# Patient Record
Sex: FEMALE | Race: WHITE | Employment: OTHER | URBAN - METROPOLITAN AREA
[De-identification: names, ages, dates, MRNs, and addresses within clinical notes are randomized per-mention and may not be internally consistent; named-entity substitution may affect disease eponyms.]

---

## 2021-05-26 ENCOUNTER — OFFICE VISIT (OUTPATIENT)
Dept: ORTHOPEDIC SURGERY | Age: 66
End: 2021-05-26
Payer: MEDICARE

## 2021-05-26 VITALS
WEIGHT: 179 LBS | OXYGEN SATURATION: 99 % | HEIGHT: 63 IN | TEMPERATURE: 97.7 F | BODY MASS INDEX: 31.71 KG/M2 | HEART RATE: 85 BPM

## 2021-05-26 DIAGNOSIS — M25.562 CHRONIC PAIN OF LEFT KNEE: Primary | ICD-10-CM

## 2021-05-26 DIAGNOSIS — M17.12 ARTHRITIS OF LEFT KNEE: ICD-10-CM

## 2021-05-26 DIAGNOSIS — G89.29 CHRONIC PAIN OF LEFT KNEE: Primary | ICD-10-CM

## 2021-05-26 PROCEDURE — G8536 NO DOC ELDER MAL SCRN: HCPCS | Performed by: PHYSICIAN ASSISTANT

## 2021-05-26 PROCEDURE — 1090F PRES/ABSN URINE INCON ASSESS: CPT | Performed by: PHYSICIAN ASSISTANT

## 2021-05-26 PROCEDURE — 99204 OFFICE O/P NEW MOD 45 MIN: CPT | Performed by: PHYSICIAN ASSISTANT

## 2021-05-26 PROCEDURE — 1101F PT FALLS ASSESS-DOCD LE1/YR: CPT | Performed by: PHYSICIAN ASSISTANT

## 2021-05-26 PROCEDURE — 73564 X-RAY EXAM KNEE 4 OR MORE: CPT | Performed by: PHYSICIAN ASSISTANT

## 2021-05-26 PROCEDURE — G9899 SCRN MAM PERF RSLTS DOC: HCPCS | Performed by: PHYSICIAN ASSISTANT

## 2021-05-26 PROCEDURE — G8432 DEP SCR NOT DOC, RNG: HCPCS | Performed by: PHYSICIAN ASSISTANT

## 2021-05-26 PROCEDURE — 3017F COLORECTAL CA SCREEN DOC REV: CPT | Performed by: PHYSICIAN ASSISTANT

## 2021-05-26 PROCEDURE — G8399 PT W/DXA RESULTS DOCUMENT: HCPCS | Performed by: PHYSICIAN ASSISTANT

## 2021-05-26 PROCEDURE — G8427 DOCREV CUR MEDS BY ELIG CLIN: HCPCS | Performed by: PHYSICIAN ASSISTANT

## 2021-05-26 PROCEDURE — G8417 CALC BMI ABV UP PARAM F/U: HCPCS | Performed by: PHYSICIAN ASSISTANT

## 2021-05-26 RX ORDER — INSULIN ASPART 100 [IU]/ML
INJECTION, SUSPENSION SUBCUTANEOUS 3 TIMES DAILY
COMMUNITY

## 2021-05-26 RX ORDER — AMITRIPTYLINE HYDROCHLORIDE 50 MG/1
TABLET, FILM COATED ORAL
COMMUNITY
End: 2022-04-06

## 2021-05-26 RX ORDER — GUAIFENESIN 100 MG/5ML
81 LIQUID (ML) ORAL DAILY
COMMUNITY

## 2021-05-26 RX ORDER — ATORVASTATIN CALCIUM 20 MG/1
20 TABLET, FILM COATED ORAL DAILY
COMMUNITY

## 2021-05-26 RX ORDER — LISINOPRIL 20 MG/1
20 TABLET ORAL DAILY
COMMUNITY

## 2021-05-26 NOTE — PROGRESS NOTES
[address] [place] [address]  [phone]           Patient: Carito Bernal                MRN: [id number]       SSN: [id number]  YOB: 1955        AGE: 72 y.o. SEX: female  Body mass index is 31.71 kg/m². PCP: Apryl Jackson MD  05/26/21      This office note has been dictated. REVIEW OF SYSTEMS:  Constitutional: Negative for fever, chills, weight loss and malaise/fatigue. HENT: Negative. Eyes: Negative. Respiratory: Negative. Cardiovascular: Negative. Gastrointestinal: No bowel incontinence or constipation. Genitourinary: No bladder incontinence or saddle anesthesia. Skin: Negative. Neurological: Negative. Endo/Heme/Allergies: Negative. Psychiatric/Behavioral: Negative. Musculoskeletal: As per HPI above. Past Medical History:   Diagnosis Date    Arthritis     Asthma     [facility] ([facility]     Epilepsy ([facility]     Epilepsy ([facility]     Hypertension     Seizures ([facility]          Current Outpatient Medications:     lisinopriL (PRINIVIL, ZESTRIL) 20 mg tablet, Take 20 mg by mouth daily. , Disp: , Rfl:     aspirin 81 mg chewable tablet, Take 81 mg by mouth daily. , Disp: , Rfl:     cholecalciferol, vitamin D3, (VITAMIN D3 PO), Take 50,000 Units by mouth every seven (7) days. , Disp: , Rfl:     amitriptyline (ELAVIL) 50 mg tablet, Take  by mouth nightly., Disp: , Rfl:     atorvastatin (LIPITOR) 20 mg tablet, Take 20 mg by mouth daily. , Disp: , Rfl:     SITagliptin (Januvia) 100 mg tablet, Take 100 mg by mouth daily. , Disp: , Rfl:     insulin glargine,hum.rec.anlog (LANTUS SC), 60 Units by SubCUTAneous route nightly., Disp: , Rfl:     insulin aspart protamine/insulin aspart (NovoLOG Mix 70-30FlexPen U-100) 100 unit/mL (70-30) inpn, by SubCUTAneous route three (3) times daily. , Disp: , Rfl:     Not on File    Social History     Socioeconomic History    Marital status:      Spouse name: Not on file    Number of children: Not on file    Years of education: Not on file    Highest education level: Not on file   Occupational History    Not on file   Tobacco Use    Smoking status: Former Smoker    Smokeless tobacco: Never Used   Substance and Sexual Activity    Alcohol use: Never    Drug use: Never    Sexual activity: Not on file   Other Topics Concern    Not on file   Social History Narrative    Not on file     Social Determinants of Health     Financial Resource Strain:     Difficulty of Paying Living Expenses:    Food Insecurity:     Worried About [address] in the Last Year:     [address] in the Last Year:    Transportation Needs:     Lack of Transportation (Medical):  Lack of Transportation (Non-Medical):    Physical Activity:     Days of Exercise per Week:     Minutes of Exercise per Session:    Stress:     Feeling of Stress :    Social Connections:     Frequency of Communication with Friends and Family:     Frequency of Social Gatherings with Friends and Family:     Attends Jain Services:     Active Member of Clubs or Organizations:     Attends Club or Organization Meetings:     Marital Status:    Intimate Partner Violence:     Fear of Current or Ex-Partner:     Emotionally Abused:     Physically Abused:     Sexually Abused:        Past Surgical History:   Procedure Laterality Date    HAND/FINGER SURGERY UNLISTED Right 1996    right thumb    HX BACK SURGERY  1998    2 rods 6 screws    HX KNEE ARTHROSCOPY Right 2002             Patient seen and evaluated today for opinion and advice regarding left knee pain. She does have discomfort in each of her knees however the left is the worst.  Is been ongoing for a number of months. She has had injection in the past which gave her no relief. She has decreased walking tolerance. She has trouble with stairs. She has trouble out of a chair. She has pain at night.     Patient denies recent fevers, chills, chest pain, SOB, or injuries. No recent systemic changes noted. A 12-point review of systems is performed today. Pertinent positives are noted. All other systems reviewed and otherwise are negative. Physical exam: General: Alert and oriented x3, nad.  well-developed, well nourished. normal affect, AF. NC/AT, EOMI, neck supple, trachea midline, no JVD present. Breathing is non-labored. Examination of the lower extremities reveals pain-free range of motion the hips. There is no pain to palpation the trochanteric bursa. Negative straight leg raise. Negative calf tenderness. Negative Homans. No signs of DVT present. Each of the knees reveal skin intact. There is no erythema, ecchymosis, warmth. There are no signs of infection or cellulitis present. She does have pain to palpation to the medial joint line of each of the knees worse on the left side as well as patellofemoral grind. Radiographs obtained the office today 5/26/2021 at the [place] location include AP, tunnel, lateral, skyline of each of the knees confirms end-stage arthritis with bone-on-bone eburnation to the medial compartment. No acute abnormalities noted. Assessment: Bilateral knee end-stage arthritis, left greater than right    Plan: At this point, we discussed treatment options. Cortisone is not effective. I do not think that she will get much relief from viscosupplementation. Surgical intervention was discussed and she like to move forward with a total knee replacement. The alternatives, risks, complications, expected outcome were discussed. The patient understands and wished to proceed. She was seen and evaluated today by Dr. Thomas Menendez as well.           JR Welch MPAS, PA-C, ATC

## 2022-01-27 ENCOUNTER — OFFICE VISIT (OUTPATIENT)
Dept: ORTHOPEDIC SURGERY | Age: 67
End: 2022-01-27
Payer: MEDICARE

## 2022-01-27 VITALS
OXYGEN SATURATION: 100 % | HEART RATE: 85 BPM | WEIGHT: 188.2 LBS | HEIGHT: 62 IN | BODY MASS INDEX: 34.63 KG/M2 | TEMPERATURE: 97.5 F

## 2022-01-27 DIAGNOSIS — M17.0 BILATERAL PRIMARY OSTEOARTHRITIS OF KNEE: ICD-10-CM

## 2022-01-27 DIAGNOSIS — M25.561 CHRONIC PAIN OF BOTH KNEES: ICD-10-CM

## 2022-01-27 DIAGNOSIS — G89.29 CHRONIC PAIN OF BOTH KNEES: ICD-10-CM

## 2022-01-27 DIAGNOSIS — E11.9 CONTROLLED TYPE 2 DIABETES MELLITUS WITHOUT COMPLICATION, UNSPECIFIED WHETHER LONG TERM INSULIN USE (HCC): ICD-10-CM

## 2022-01-27 DIAGNOSIS — M54.16 LUMBAR RADICULOPATHY: Primary | ICD-10-CM

## 2022-01-27 DIAGNOSIS — M70.62 GREATER TROCHANTERIC BURSITIS OF LEFT HIP: ICD-10-CM

## 2022-01-27 DIAGNOSIS — R20.2 NUMBNESS AND TINGLING OF LEFT LEG: ICD-10-CM

## 2022-01-27 DIAGNOSIS — M25.562 CHRONIC PAIN OF BOTH KNEES: ICD-10-CM

## 2022-01-27 DIAGNOSIS — R20.0 NUMBNESS AND TINGLING OF LEFT LEG: ICD-10-CM

## 2022-01-27 PROCEDURE — G8427 DOCREV CUR MEDS BY ELIG CLIN: HCPCS | Performed by: ORTHOPAEDIC SURGERY

## 2022-01-27 PROCEDURE — 3017F COLORECTAL CA SCREEN DOC REV: CPT | Performed by: ORTHOPAEDIC SURGERY

## 2022-01-27 PROCEDURE — G9899 SCRN MAM PERF RSLTS DOC: HCPCS | Performed by: ORTHOPAEDIC SURGERY

## 2022-01-27 PROCEDURE — G8399 PT W/DXA RESULTS DOCUMENT: HCPCS | Performed by: ORTHOPAEDIC SURGERY

## 2022-01-27 PROCEDURE — 1101F PT FALLS ASSESS-DOCD LE1/YR: CPT | Performed by: ORTHOPAEDIC SURGERY

## 2022-01-27 PROCEDURE — G8536 NO DOC ELDER MAL SCRN: HCPCS | Performed by: ORTHOPAEDIC SURGERY

## 2022-01-27 PROCEDURE — 2022F DILAT RTA XM EVC RTNOPTHY: CPT | Performed by: ORTHOPAEDIC SURGERY

## 2022-01-27 PROCEDURE — 1090F PRES/ABSN URINE INCON ASSESS: CPT | Performed by: ORTHOPAEDIC SURGERY

## 2022-01-27 PROCEDURE — 3046F HEMOGLOBIN A1C LEVEL >9.0%: CPT | Performed by: ORTHOPAEDIC SURGERY

## 2022-01-27 PROCEDURE — 99214 OFFICE O/P EST MOD 30 MIN: CPT | Performed by: ORTHOPAEDIC SURGERY

## 2022-01-27 PROCEDURE — G8417 CALC BMI ABV UP PARAM F/U: HCPCS | Performed by: ORTHOPAEDIC SURGERY

## 2022-01-27 PROCEDURE — G8510 SCR DEP NEG, NO PLAN REQD: HCPCS | Performed by: ORTHOPAEDIC SURGERY

## 2022-01-27 RX ORDER — GABAPENTIN 300 MG/1
CAPSULE ORAL
COMMUNITY
Start: 2021-12-26 | End: 2022-03-03

## 2022-01-27 RX ORDER — DICLOFENAC SODIUM 10 MG/G
GEL TOPICAL 4 TIMES DAILY
Qty: 100 G | Refills: 4 | Status: SHIPPED | OUTPATIENT
Start: 2022-01-27

## 2022-01-27 RX ORDER — METFORMIN HYDROCHLORIDE 1000 MG/1
1000 TABLET ORAL 2 TIMES DAILY
COMMUNITY

## 2022-01-27 RX ORDER — ERGOCALCIFEROL 1.25 MG/1
50000 CAPSULE ORAL
COMMUNITY

## 2022-01-27 RX ORDER — INSULIN LISPRO 100 [IU]/ML
12 INJECTION, SOLUTION INTRAVENOUS; SUBCUTANEOUS
COMMUNITY

## 2022-01-27 NOTE — PROGRESS NOTES
Patient: Rober Cota                MRN: [id number]       SSN: [id number]  YOB: 1955        AGE: 77 y.o. SEX: female  Body mass index is 34.42 kg/m². PCP: Wilfrid Griffith MD  01/27/22    Nirav Melgoza presents today for reevaluation of knee pain as well as low back pain with radiculopathy down the left leg going going all the way down to the toes if she stands at the kitchen sink for any prolonged period of time she at bedtime to go sit down no groin pain hurts her to roll over on the left hip at night as well she was scheduled to have knee replacement surgery but she has been she was having some other issues and canceled it will eventually be doing knee replacement surgeries both of them will need doing in a staged fashion    The pain is radicular a mild to moderate aching she points to the low back and left lateral hip area no groin discomfort.     The examination today she has quite restricted walking both knees are bad in varus antalgic bilateral gait holds her back stiffly she has difficulty negotiating stairs or steps and was difficult for her to get up on the exam table the low back is somewhat tender and she rests comfortably both hips rotate adequately including internal rotation and flexion she is tender over the greater trochanter on the left side she has decreased sensation to L4-5 worse on the left than the right EHLs are 5- out of 5 to bands of 5 out of 5 and straight leg raise is borderline positive with some radiculopathy extending just past the level of the knee but just mildly so the knees themselves are similar exam is to previous in varus couple degree fixed flexion deformity bends to 105 degrees and Homans' sign is negative    I did review previous x-rays of the knees which confirm end-stage arthritis of both knees    MRI without contrast done in early fall of last year confirm that her fusion looks solid but she is having degenerative and stenotic changes above and below the fusion levels. There was a discussion regarding surgery surgery is not recommended for the hip but is recommended for the knees I would recommend also a we also decided that she should see spine again I have offered to inject the left hip she is declined she like to try some cream and some therapy think it be very reasonable for her and I think a little bit of up rehab therapy I think will be helpful for eventually before knee replacements as well    We will see her back in the not-too-distant future we could consider injection for the left hip will do an AP pelvis when she returns    REVIEW OF SYSTEMS:      CON: negative  EYE: negative   ENT: negative  RESP: negative  GI:    negative   :  negative  MSK: Positive  A twelve point review of systems was completed, positives noted and all other systems were reviewed and are negative          Past Medical History:   Diagnosis Date    Arthritis     Asthma     Diabetes ([facility]     Epilepsy ([facility]     Epilepsy ([facility]     Hypertension     [facility]        History reviewed. No pertinent family history. Current Outpatient Medications   Medication Sig Dispense Refill    ergocalciferol (ERGOCALCIFEROL) 1,250 mcg (50,000 unit) capsule Take 50,000 Units by mouth every seven (7) days.  gabapentin (NEURONTIN) 300 mg capsule TAKE 1 CAPSULE BY MOUTH THREE TIMES DAILY FOR 30 DAYS      insulin lispro (HUMALOG) 100 unit/mL kwikpen 12 Units by SubCUTAneous route.  liraglutide (VICTOZA) 0.6 mg/0.1 mL (18 mg/3 mL) pnij 1.2 mg by SubCUTAneous route.  metFORMIN (GLUCOPHAGE) 1,000 mg tablet Take 1,000 mg by mouth two (2) times a day.  lisinopriL (PRINIVIL, ZESTRIL) 20 mg tablet Take 20 mg by mouth daily.  aspirin 81 mg chewable tablet Take 81 mg by mouth daily.  amitriptyline (ELAVIL) 50 mg tablet Take  by mouth nightly.  atorvastatin (LIPITOR) 20 mg tablet Take 20 mg by mouth daily.       SITagliptin (Januvia) 100 mg tablet Take 100 mg by mouth daily.  insulin glargine,hum.rec.anlog (LANTUS SC) 60 Units by SubCUTAneous route nightly.  insulin aspart protamine/insulin aspart (NovoLOG Mix 70-30FlexPen U-100) 100 unit/mL (70-30) inpn by SubCUTAneous route three (3) times daily. Not on File    Past Surgical History:   Procedure Laterality Date    HAND/FINGER SURGERY UNLISTED Right 1996    right thumb    HX BACK SURGERY  1998    2 rods 6 screws    HX KNEE ARTHROSCOPY Right 2002       Social History     Socioeconomic History    Marital status:      Spouse name: Not on file    Number of children: Not on file    Years of education: Not on file    Highest education level: Not on file   Occupational History    Not on file   Tobacco Use    Smoking status: Former Smoker    Smokeless tobacco: Never Used   Vaping Use    Vaping Use: Never used   Substance and Sexual Activity    Alcohol use: Never    Drug use: Never    Sexual activity: Not on file   Other Topics Concern    Not on file   Social History Narrative    Not on file     Social Determinants of Health     Financial Resource Strain:     Difficulty of Paying Living Expenses: Not on file   Food Insecurity:     Worried About [address] in the Last Year: Not on file    [address] in the Last Year: Not on file   Transportation Needs:     Lack of Transportation (Medical): Not on file    Lack of Transportation (Non-Medical):  Not on file   Physical Activity:     Days of Exercise per Week: Not on file    Minutes of Exercise per Session: Not on file   Stress:     Feeling of Stress : Not on file   Social Connections:     Frequency of Communication with Friends and Family: Not on file    Frequency of Social Gatherings with Friends and Family: Not on file    Attends Adventism Services: Not on file    Active Member of Clubs or Organizations: Not on file    Attends Club or Organization Meetings: Not on file    Marital Status: Not on file Intimate Partner Violence:     Fear of Current or Ex-Partner: Not on file    Emotionally Abused: Not on file    Physically Abused: Not on file    Sexually Abused: Not on file   Housing Stability:     Unable to Pay for Housing in the Last Year: Not on file    Number of [place] in the Last Year: Not on file    Unstable Housing in the Last Year: Not on file       Visit Vitals  Pulse 85   Temp 97.5 °F (36.4 °C) (Temporal)   Ht 5' 2\" (1.575 m)   Wt 85.4 kg (188 lb 3.2 oz)   SpO2 100%   BMI 34.42 kg/m²         PHYSICAL EXAMINATION:  GENERAL: Alert and oriented x3, in no acute distress, well-developed, well-nourished, afebrile. HEART: No JVD. EYES: No scleral icterus   NECK: No significant lymphadenopathy   LUNGS: No respiratory compromise or indrawing  ABDOMEN: Soft, non-tender, non-distended. Note: This note was completed using voice recognition software. Any typographical/name errors or mistakes are unintentional.    Electronically signed by:  Albaro Lopez MD

## 2022-02-04 ENCOUNTER — TELEPHONE (OUTPATIENT)
Dept: ORTHOPEDIC SURGERY | Age: 67
End: 2022-02-04

## 2022-03-02 NOTE — PROGRESS NOTES
[facility]  [address], [address]  Phone: [phone]  Fax: [phone]        INITIAL CONSULTATION      HISTORY OF PRESENT ILLNESS:  Nhung Villaseñor is a 77 y.o. female whom is referred from Dr. Stanton Huston secondary to BLE pain from her thighs in a L4 distribution to her toes. She rates her pain 9/10. Her pain is not exacerbated positionally. Pt reports limitations with walking. Pt admits to chronic hx of BUE and BLE paresthesias. Pt reports she initially complained of LLE pain x 9/2021 without injury. RLE sxs started a week after her visit with Dr. Stanton Huston on 1/27/2022. Denies much in the way of back pain. Pt had MRI done 8/11/2021 prior to reported pain complaint duration, pt states she had been sent to the ER on 8/11/2021 by her PCP secondary to left hip pain. Pt admits she is taking Neurontin 300 mg TID without relief through Rhett Floyd MD x 1 month. Pt is tolerating the medication well, Pt admits she underwent previous spinal surgery on 1/8/1998 through Dr. Jessy Srinivasan MD with good relief. Pt states she had been pain free up until last year. Patient denies previous spinal injections, or physical therapy/chiropractic care. Pt denies change in bowel or bladder habits. Pt denies fever, weight loss, or skin changes. Pt denies h/o stomach ulcers or bleeding disorders. PmHx of epilepsy, HTN, DM, Asthma, spinal surgery (L4-L5-S1 posterior fusion). Pt admits her blood sugars occasionally run above 200. Denies being followed for her DM. Note from Dr. Stanton Huston dated 1/27/2022 indicating patient was seen with c/o knee and back pain with radicular pain into the LLE to the toes. Worse with standing. Improved with sitting. End stage osteoarthritsi of bilateral knees. [facility] endorsed left hip pain that he offered to injection, pt declined. Referred to hip PT. Referred to spine. Lumbar spine MRI dated 8/11/2021 films independently reviewed.  Per report, stenosis/impingement: Central stenosis:  None significant. Lateral recess stenosis:  Low-grade left L1-L2. Foraminal stenosis:  None significant. Structure: L4-L5-S1 posterior fusion and decompression with solid osseous union. Advanced L2-L3 degenerative facet arthropathy with low-grade L2 anterolisthesis. Moderate disc degeneration T12-L3. The patient is RHD.  reviewed. Body mass index is 34.39 kg/m². PCP: Xavi Hector MD    Past Medical History:   Diagnosis Date    Arthritis     Asthma     [facility] ([facility]     Epilepsy ([facility]     Epilepsy ([facility]     Hypertension     Seizures ([facility]           Past Surgical History:   Procedure Laterality Date    HAND/FINGER SURGERY UNLISTED Right 1996    right thumb    HX BACK SURGERY  1998    2 rods 6 screws    HX KNEE ARTHROSCOPY Right 2002         Social History     Tobacco Use    Smoking status: Former Smoker    Smokeless tobacco: Never Used   Substance Use Topics    Alcohol use: Never       Work status: N/A  Marital status: N/A      Current Outpatient Medications   Medication Sig Dispense Refill    traZODone (DESYREL) 50 mg tablet TAKE 1 TABLET BY MOUTH EVERY DAY AT BEDTIME      ergocalciferol (ERGOCALCIFEROL) 1,250 mcg (50,000 unit) capsule Take 50,000 Units by mouth every seven (7) days.  insulin lispro (HUMALOG) 100 unit/mL kwikpen 12 Units by SubCUTAneous route.  liraglutide (VICTOZA) 0.6 mg/0.1 mL (18 mg/3 mL) pnij 1.2 mg by SubCUTAneous route.  metFORMIN (GLUCOPHAGE) 1,000 mg tablet Take 1,000 mg by mouth two (2) times a day.  diclofenac (VOLTAREN) 1 % gel Apply  to affected area four (4) times daily. 100 g 4    lisinopriL (PRINIVIL, ZESTRIL) 20 mg tablet Take 20 mg by mouth daily.  aspirin 81 mg chewable tablet Take 81 mg by mouth daily.  atorvastatin (LIPITOR) 20 mg tablet Take 20 mg by mouth daily.  insulin glargine,hum.rec.anlog (LANTUS SC) 60 Units by SubCUTAneous route nightly.       insulin aspart protamine/insulin aspart (NovoLOG Mix 70-30FlexPen U-100) 100 unit/mL (70-30) inpn by SubCUTAneous route three (3) times daily.  amitriptyline (ELAVIL) 50 mg tablet Take  by mouth nightly. (Patient not taking: Reported on 3/3/2022)      SITagliptin (Januvia) 100 mg tablet Take 100 mg by mouth daily. (Patient not taking: Reported on 3/3/2022)         No Known Allergies       History reviewed. No pertinent family history. REVIEW OF SYSTEMS  Constitutional symptoms: Negative  Eyes: Negative  Ears, Nose, Throat, and Mouth: Negative  Cardiovascular: Negative  Respiratory: Negative  Genitourinary: Negative  Integumentary (Skin and/or breast): Negative  Musculoskeletal: Positive for BLE   Extremities: Negative for edema. Endocrine/Rheumatologic: Negative  Hematologic/Lymphatic: Negative  Allergic/Immunologic: Negative  Psychiatric: Negative       PHYSICAL EXAMINATION  Visit Vitals  Pulse 71   Temp 98.8 °F (37.1 °C) (Temporal)   Ht 5' 2\" (1.575 m)   Wt 188 lb (85.3 kg)   SpO2 100%   BMI 34.39 kg/m²       CONSTITUTIONAL: NAD, A&O x 3  HEART: Regular rate and rhythm  GASTROINTESTINAL: Positive bowel sounds, soft, nontender, and nondistended  LUNGS: Clear to auscultation bilaterally. SKIN: Negative for rash. Well healed midline incision  RANGE OF MOTION: The patient has full passive range of motion in all four extremities. SENSATION: sensation is intact to light touch throughout. MOTOR:   Straight Leg Raise: Negative, bilateral  Martínez: Negative, bilateral  Tandem Gait: Neg. Deep tendon reflexes are 1 at the biceps, 1 at the brachioradialis and 0 on the right and trace on the left at the triceps, bilaterally. Deep tendon reflexes are 0 at the knees and 0 at the ankles bilaterally.   Examined in a wheelchair    Pt reports RLE weakness     Shoulder AB/Flex Elbow Flex Wrist Ext Elbow Ext Wrist Flex Hand Intrin Tone   Right +4/5 +4/5 +4/5 +4/5 +4/5 +4/5 +4/5   Left +4/5 +4/5 +4/5 +4/5 +4/5 +4/5 +4/5 Hip Flex Knee Ext Knee Flex Ankle DF GTE Ankle PF Tone   Right +4/5 +4/5 +4/5 +4/5 +4/5 +4/5 +4/5   Left +4/5 +4/5 +4/5 +4/5 +4/5 +4/5 +4/5       ASSESSMENT   Diagnoses and all orders for this visit:    1. Facet arthropathy    2. Lumbar neuritis    3. Lumbar post-laminectomy syndrome        IMPRESSIONS/RECOMMENDATIONS:  Patient presents today with c/o BLE pain from her thighs in a L4 distribution to her toes. Multiple treatment options were discussed. Pt reports her blood sugars run above 200, I am unable explain her sxs based on her L spine MRI. I suspect her sxs may be related to neuropathy. I will order a BLE EMG for further evaluation of her sxs. I will increase her Neurontin 300 mg TID to 600 mg TID. Patient advised to call office if intolerant to higher dose. Patient is neurologically intact. I will see the patient back in 1 month's time or earlier if needed. Written by Emma Paredes, as dictated by Rossy Santos MD  I examined the patient, reviewed and agree with the note.

## 2022-03-03 ENCOUNTER — OFFICE VISIT (OUTPATIENT)
Dept: ORTHOPEDIC SURGERY | Age: 67
End: 2022-03-03
Payer: MEDICARE

## 2022-03-03 VITALS
HEIGHT: 62 IN | HEART RATE: 71 BPM | OXYGEN SATURATION: 100 % | BODY MASS INDEX: 34.6 KG/M2 | WEIGHT: 188 LBS | TEMPERATURE: 98.8 F

## 2022-03-03 DIAGNOSIS — M96.1 LUMBAR POST-LAMINECTOMY SYNDROME: ICD-10-CM

## 2022-03-03 DIAGNOSIS — M54.16 LUMBAR NEURITIS: ICD-10-CM

## 2022-03-03 DIAGNOSIS — M47.819 FACET ARTHROPATHY: Primary | ICD-10-CM

## 2022-03-03 PROCEDURE — 3017F COLORECTAL CA SCREEN DOC REV: CPT | Performed by: PHYSICAL MEDICINE & REHABILITATION

## 2022-03-03 PROCEDURE — 1090F PRES/ABSN URINE INCON ASSESS: CPT | Performed by: PHYSICAL MEDICINE & REHABILITATION

## 2022-03-03 PROCEDURE — G8399 PT W/DXA RESULTS DOCUMENT: HCPCS | Performed by: PHYSICAL MEDICINE & REHABILITATION

## 2022-03-03 PROCEDURE — G8417 CALC BMI ABV UP PARAM F/U: HCPCS | Performed by: PHYSICAL MEDICINE & REHABILITATION

## 2022-03-03 PROCEDURE — 99204 OFFICE O/P NEW MOD 45 MIN: CPT | Performed by: PHYSICAL MEDICINE & REHABILITATION

## 2022-03-03 PROCEDURE — G9899 SCRN MAM PERF RSLTS DOC: HCPCS | Performed by: PHYSICAL MEDICINE & REHABILITATION

## 2022-03-03 PROCEDURE — G8536 NO DOC ELDER MAL SCRN: HCPCS | Performed by: PHYSICAL MEDICINE & REHABILITATION

## 2022-03-03 PROCEDURE — G8432 DEP SCR NOT DOC, RNG: HCPCS | Performed by: PHYSICAL MEDICINE & REHABILITATION

## 2022-03-03 PROCEDURE — G8427 DOCREV CUR MEDS BY ELIG CLIN: HCPCS | Performed by: PHYSICAL MEDICINE & REHABILITATION

## 2022-03-03 PROCEDURE — 1101F PT FALLS ASSESS-DOCD LE1/YR: CPT | Performed by: PHYSICAL MEDICINE & REHABILITATION

## 2022-03-03 RX ORDER — TRAZODONE HYDROCHLORIDE 50 MG/1
TABLET ORAL
COMMUNITY
Start: 2022-02-07

## 2022-03-03 RX ORDER — GABAPENTIN 600 MG/1
600 TABLET ORAL 3 TIMES DAILY
Qty: 90 TABLET | Refills: 1 | Status: SHIPPED | OUTPATIENT
Start: 2022-03-03 | End: 2022-04-06

## 2022-03-03 NOTE — LETTER
3/3/2022    Patient: Rober Cota   YOB: 1955   Date of Visit: 3/3/2022     lAma Arias MD  [address] [address]  Via Fax: Samantha Nieves MD  [address] [id number]  [address] [facility] [address]    Dear Jacquelene Bang, MD Severa Pfeiffer, MD,      Thank you for referring Ms. Rober Cota to [facility] for evaluation. My notes for this consultation are attached. If you have questions, please do not hesitate to call me. I look forward to following your patient along with you.       Sincerely,    Sammi Campbell MD

## 2022-03-04 DIAGNOSIS — M96.1 LUMBAR POST-LAMINECTOMY SYNDROME: ICD-10-CM

## 2022-03-04 DIAGNOSIS — M54.16 LUMBAR NEURITIS: ICD-10-CM

## 2022-03-04 DIAGNOSIS — M47.819 FACET ARTHROPATHY: ICD-10-CM

## 2022-03-10 ENCOUNTER — OFFICE VISIT (OUTPATIENT)
Dept: ORTHOPEDIC SURGERY | Age: 67
End: 2022-03-10
Payer: MEDICARE

## 2022-03-10 VITALS
BODY MASS INDEX: 34.6 KG/M2 | HEIGHT: 62 IN | HEART RATE: 65 BPM | TEMPERATURE: 97.1 F | WEIGHT: 188 LBS | OXYGEN SATURATION: 100 %

## 2022-03-10 DIAGNOSIS — M54.50 LUMBAR PAIN: ICD-10-CM

## 2022-03-10 DIAGNOSIS — R26.2 DIFFICULTY WALKING: ICD-10-CM

## 2022-03-10 DIAGNOSIS — M17.0 BILATERAL PRIMARY OSTEOARTHRITIS OF KNEE: ICD-10-CM

## 2022-03-10 DIAGNOSIS — M70.62 GREATER TROCHANTERIC BURSITIS OF LEFT HIP: Primary | ICD-10-CM

## 2022-03-10 PROCEDURE — 1101F PT FALLS ASSESS-DOCD LE1/YR: CPT | Performed by: ORTHOPAEDIC SURGERY

## 2022-03-10 PROCEDURE — G8399 PT W/DXA RESULTS DOCUMENT: HCPCS | Performed by: ORTHOPAEDIC SURGERY

## 2022-03-10 PROCEDURE — G8427 DOCREV CUR MEDS BY ELIG CLIN: HCPCS | Performed by: ORTHOPAEDIC SURGERY

## 2022-03-10 PROCEDURE — G8417 CALC BMI ABV UP PARAM F/U: HCPCS | Performed by: ORTHOPAEDIC SURGERY

## 2022-03-10 PROCEDURE — G8536 NO DOC ELDER MAL SCRN: HCPCS | Performed by: ORTHOPAEDIC SURGERY

## 2022-03-10 PROCEDURE — 3017F COLORECTAL CA SCREEN DOC REV: CPT | Performed by: ORTHOPAEDIC SURGERY

## 2022-03-10 PROCEDURE — G8432 DEP SCR NOT DOC, RNG: HCPCS | Performed by: ORTHOPAEDIC SURGERY

## 2022-03-10 PROCEDURE — 72170 X-RAY EXAM OF PELVIS: CPT | Performed by: ORTHOPAEDIC SURGERY

## 2022-03-10 PROCEDURE — G9899 SCRN MAM PERF RSLTS DOC: HCPCS | Performed by: ORTHOPAEDIC SURGERY

## 2022-03-10 PROCEDURE — 1090F PRES/ABSN URINE INCON ASSESS: CPT | Performed by: ORTHOPAEDIC SURGERY

## 2022-03-10 PROCEDURE — 99214 OFFICE O/P EST MOD 30 MIN: CPT | Performed by: ORTHOPAEDIC SURGERY

## 2022-03-10 NOTE — PROGRESS NOTES
Patient: William Lundborg                MRN: [id number]       SSN: [id number]  YOB: 1955        AGE: 77 y.o. SEX: female  Body mass index is 34.39 kg/m². PCP: Keely Sy MD  03/10/22    Tho Mclaughlin presents today for reevaluation of low back pain with radiculopathy hip pain knee pain she has known end-stage arthritis of the knee is unrevealing and surgery she may require a back procedure she saw Dr. Jade Saenz she can be having an EMG nerve conduction study she has quite symptomatic stenosis and frequently has to lean forward she does better behind a shopping cart when she goes to the store. The examination today her hips rotate adequately pop positive straight leg raise bilaterally and Homans' sign is negative both feet warm and well-perfused. Calf nontender she does have some decrease sensation L4 and L5 although no foot drop. Low back's mildly tender    Reviewed the x-rays from today AP pelvis on 3/10/2022 confirms just mild arthritis of the hips I do not recommend hip replaced replacement surgery for we decided we discussed that decided together and I think she should hold off on the right surgery as well until the back is straight and around I am very pleased she is seeing Dr. Jade Saenz I can see her back in a few months time to to check on her progress a prescription for a wheeled walker with a seat was issued    REVIEW OF SYSTEMS:      CON: negative  EYE: negative   ENT: negative  RESP: negative  GI:    negative   :  negative  MSK: Positive  A twelve point review of systems was completed, positives noted and all other systems were reviewed and are negative          Past Medical History:   Diagnosis Date    Arthritis     Asthma     Diabetes ([facility]     Epilepsy ([facility]     Epilepsy ([facility]     Hypertension     [facility]        History reviewed. No pertinent family history.     Current Outpatient Medications   Medication Sig Dispense Refill    traZODone (DESYREL) 50 mg tablet TAKE 1 TABLET BY MOUTH EVERY DAY AT BEDTIME      liraglutide (VICTOZA) 0.6 mg/0.1 mL (18 mg/3 mL) pnij 1.2 mg by SubCUTAneous route.  metFORMIN (GLUCOPHAGE) 1,000 mg tablet Take 1,000 mg by mouth two (2) times a day.  diclofenac (VOLTAREN) 1 % gel Apply  to affected area four (4) times daily. 100 g 4    lisinopriL (PRINIVIL, ZESTRIL) 20 mg tablet Take 20 mg by mouth daily.  aspirin 81 mg chewable tablet Take 81 mg by mouth daily.  atorvastatin (LIPITOR) 20 mg tablet Take 20 mg by mouth daily.  insulin glargine,hum.rec.anlog (LANTUS SC) 60 Units by SubCUTAneous route nightly.  gabapentin (Neurontin) 600 mg tablet Take 1 Tablet by mouth three (3) times daily. Max Daily Amount: 1,800 mg. (Patient not taking: Reported on 3/10/2022) 90 Tablet 1    ergocalciferol (ERGOCALCIFEROL) 1,250 mcg (50,000 unit) capsule Take 50,000 Units by mouth every seven (7) days. (Patient not taking: Reported on 3/10/2022)      insulin lispro (HUMALOG) 100 unit/mL kwikpen 12 Units by SubCUTAneous route.  amitriptyline (ELAVIL) 50 mg tablet Take  by mouth nightly. (Patient not taking: Reported on 3/3/2022)      SITagliptin (Januvia) 100 mg tablet Take 100 mg by mouth daily. (Patient not taking: Reported on 3/3/2022)      insulin aspart protamine/insulin aspart (NovoLOG Mix 70-30FlexPen U-100) 100 unit/mL (70-30) inpn by SubCUTAneous route three (3) times daily.          No Known Allergies    Past Surgical History:   Procedure Laterality Date    HAND/FINGER SURGERY UNLISTED Right 1996    right thumb    HX BACK SURGERY  1998    2 rods 6 screws    HX KNEE ARTHROSCOPY Right 2002       Social History     Socioeconomic History    Marital status:      Spouse name: Not on file    Number of children: Not on file    Years of education: Not on file    Highest education level: Not on file   Occupational History    Not on file   Tobacco Use    Smoking status: Former Smoker    Smokeless tobacco: Never Used   Vaping Use    Vaping Use: Never used   Substance and Sexual Activity    Alcohol use: Never    Drug use: Never    Sexual activity: Not on file   Other Topics Concern    Not on file   Social History Narrative    Not on file     Social Determinants of Health     Financial Resource Strain:     Difficulty of Paying Living Expenses: Not on file   Food Insecurity:     Worried About Running Out of Food in the Last Year: Not on file    Stephanie of Food in the Last Year: Not on file   Transportation Needs:     Lack of Transportation (Medical): Not on file    Lack of Transportation (Non-Medical): Not on file   Physical Activity:     Days of Exercise per Week: Not on file    Minutes of Exercise per Session: Not on file   Stress:     Feeling of Stress : Not on file   Social Connections:     Frequency of Communication with Friends and Family: Not on file    Frequency of Social Gatherings with Friends and Family: Not on file    Attends Judaism Services: Not on file    Active Member of [address] [place] [address] or Organizations: Not on file    Attends Club or Organization Meetings: Not on file    Marital Status: Not on file   Intimate Partner Violence:     Fear of Current or Ex-Partner: Not on file    Emotionally Abused: Not on file    Physically Abused: Not on file    Sexually Abused: Not on file   Housing Stability:     Unable to Pay for Housing in the Last Year: Not on file    Number of [place] in the Last Year: Not on file    Unstable Housing in the Last Year: Not on file       Visit Vitals  Pulse 65   Temp 97.1 °F (36.2 °C) (Temporal)   Ht 5' 2\" (1.575 m)   Wt 188 lb (85.3 kg)   SpO2 100%   BMI 34.39 kg/m²         PHYSICAL EXAMINATION:  GENERAL: Alert and oriented x3, in no acute distress, well-developed, well-nourished, afebrile. HEART: No JVD.   EYES: No scleral icterus   NECK: No significant lymphadenopathy   LUNGS: No respiratory compromise or indrawing  ABDOMEN: Soft, non-tender, non-distended. Note: This note was completed using voice recognition software. Any typographical/name errors or mistakes are unintentional.    Electronically signed by:  Katy Godoy MD

## 2022-04-06 ENCOUNTER — OFFICE VISIT (OUTPATIENT)
Dept: ORTHOPEDIC SURGERY | Age: 67
End: 2022-04-06
Payer: MEDICARE

## 2022-04-06 ENCOUNTER — TELEPHONE (OUTPATIENT)
Dept: ORTHOPEDIC SURGERY | Age: 67
End: 2022-04-06

## 2022-04-06 VITALS
TEMPERATURE: 96.8 F | SYSTOLIC BLOOD PRESSURE: 170 MMHG | HEIGHT: 62 IN | WEIGHT: 186 LBS | HEART RATE: 69 BPM | BODY MASS INDEX: 34.23 KG/M2 | OXYGEN SATURATION: 100 % | DIASTOLIC BLOOD PRESSURE: 73 MMHG

## 2022-04-06 DIAGNOSIS — M96.1 LUMBAR POST-LAMINECTOMY SYNDROME: ICD-10-CM

## 2022-04-06 DIAGNOSIS — R20.0 NUMBNESS AND TINGLING OF BOTH LOWER EXTREMITIES: Primary | ICD-10-CM

## 2022-04-06 DIAGNOSIS — R20.2 NUMBNESS AND TINGLING OF BOTH LOWER EXTREMITIES: Primary | ICD-10-CM

## 2022-04-06 DIAGNOSIS — R94.131 ABNORMAL EMG: ICD-10-CM

## 2022-04-06 DIAGNOSIS — M79.604 PAIN IN BOTH LOWER EXTREMITIES: ICD-10-CM

## 2022-04-06 DIAGNOSIS — R20.2 NUMBNESS AND TINGLING OF BOTH LOWER EXTREMITIES: ICD-10-CM

## 2022-04-06 DIAGNOSIS — R20.0 NUMBNESS AND TINGLING OF BOTH LOWER EXTREMITIES: ICD-10-CM

## 2022-04-06 DIAGNOSIS — M79.605 PAIN IN BOTH LOWER EXTREMITIES: ICD-10-CM

## 2022-04-06 DIAGNOSIS — M54.16 LUMBAR NEURITIS: ICD-10-CM

## 2022-04-06 PROCEDURE — 95910 NRV CNDJ TEST 7-8 STUDIES: CPT | Performed by: PHYSICAL MEDICINE & REHABILITATION

## 2022-04-06 PROCEDURE — 95886 MUSC TEST DONE W/N TEST COMP: CPT | Performed by: PHYSICAL MEDICINE & REHABILITATION

## 2022-04-06 RX ORDER — GABAPENTIN 800 MG/1
800 TABLET ORAL 3 TIMES DAILY
Qty: 90 TABLET | Refills: 1 | Status: SHIPPED | OUTPATIENT
Start: 2022-04-06

## 2022-04-06 NOTE — PROGRESS NOTES
Beata Frye presents today for   Chief Complaint   Patient presents with    Procedure     EMG BLE       Is someone accompanying this pt? no    Is the patient using any DME equipment during OV? no    Depression Screening:  3 most recent PHQ Screens 1/27/2022   Little interest or pleasure in doing things Not at all   Feeling down, depressed, irritable, or hopeless Not at all   Total Score PHQ 2 0       Learning Assessment:  Learning Assessment 4/6/2022   PRIMARY LEARNER Patient   PRIMARY LANGUAGE ENGLISH   LEARNER PREFERENCE PRIMARY READING   ANSWERED BY patient   RELATIONSHIP SELF       Abuse Screening:  Abuse Screening Questionnaire 4/6/2022   Do you ever feel afraid of your partner? N   Are you in a relationship with someone who physically or mentally threatens you? N   Is it safe for you to go home? Y       Fall Risk  Fall Risk Assessment, last 12 mths 1/27/2022   Able to walk? Yes   Fall in past 12 months? 0   Do you feel unsteady? 1   Are you worried about falling 1   Is TUG test greater than 12 seconds? 1   Is the gait abnormal? 1   Number of falls in past 12 months 0   Fall with injury? -       Coordination of Care:  1. Have you been to the ER, urgent care clinic since your last visit? no  Hospitalized since your last visit? no    2. Have you seen or consulted any other health care providers outside of the [address] [place] [address] since your last visit? no Include any pap smears or colon screening.  no

## 2022-04-06 NOTE — PROGRESS NOTES
[address]  [address]  [address]  Phone: [phone]  Fax: [phone]        Charley aMrie  : 1955  PCP: Camila Christine MD  2022    ELECTROMYOGRAPHY AND NERVE CONDUCTION STUDIES    Kojo Boyd was referred by Dr. Pepper Price for electrodiagnostic evaluation of BLE paraesthesia. NCV & EMG Findings:  Evaluation of the left tibial motor and the right tibial motor nerves showed reduced amplitude (L3.4, R3.5 mV) and decreased conduction velocity (Knee-Ankle, L34, R35 m/s). The left Sup Fibular sensory nerve showed no response (14 cm). The right Sup Fibular sensory nerve showed no response (14 cm) and no response (Site 2). The left sural sensory nerve showed no response (Calf). The right sural sensory nerve showed no response (Calf) and no response (Site 2). All remaining nerves (as indicated in the following tables) were within normal limits. Left vs. Right side comparison data for the Fibular motor nerve indicates abnormal L-R velocity difference (Poplt-B Fib, 26 m/s). All remaining left vs. right side differences were within normal limits. Needle evaluation of the right anterior tibialis muscle showed increased insertional activity, slightly increased spontaneous activity, and moderately increased polyphasic potentials. The right Fibularis Long, the left anterior tibialis, and the right abductor hallucis muscles showed moderately increased polyphasic potentials. The left Fibularis Long muscle showed slightly increased polyphasic potentials. The left posterior tibialis muscle showed increased insertional activity and slightly increased spontaneous activity. The left abductor hallucis muscle showed increased motor unit amplitude and moderately increased polyphasic potentials. All remaining muscles (as indicated in the following table) showed no evidence of electrical instability.       INTERPRETATION    This is an abnormal electrodiagnostic examination. These findings may be consistent with:   1. Severe chronic sensorimotor peripheral polyneuropathy - this is based on abnormalities throughout the NCS bilaterally. There are scattered signs of chronicity and mild muscle membrane instability. CLINICAL INTERPRETATION    Her electrodiagnostic findings may contribute to her pain symptoms, but are unlikely to fully explain the full extent of her presentation. HISTORY OF PRESENT ILLNESS  Senia Duncan is a 77 y.o. female. Pt presents today for BLE EMG evaluation of BLE pain, numbness, and tingling, R>L. Pt notes that she has severe pain in her right ankle that feels like someone is crushing it. She is s/p lumbar fusion L4-S1 (~1998) PmHx: DM    PAST MEDICAL HISTORY   Past Medical History:   Diagnosis Date    Arthritis     Asthma     [facility]     Epilepsy [facility]     Epile[facility]     Hypertension     Seizures ([facility]        Past Surgical History:   Procedure Laterality Date    HAND/FINGER SURGERY UNLISTED Right 1996    right thumb    HX BACK SURGERY  1998    2 rods 6 screws    HX KNEE ARTHROSCOPY Right 2002   . MEDICATIONS    Current Outpatient Medications   Medication Sig Dispense Refill    traZODone (DESYREL) 50 mg tablet TAKE 1 TABLET BY MOUTH EVERY DAY AT BEDTIME      gabapentin (Neurontin) 600 mg tablet Take 1 Tablet by mouth three (3) times daily. Max Daily Amount: 1,800 mg. (Patient not taking: Reported on 3/10/2022) 90 Tablet 1    ergocalciferol (ERGOCALCIFEROL) 1,250 mcg (50,000 unit) capsule Take 50,000 Units by mouth every seven (7) days. (Patient not taking: Reported on 3/10/2022)      insulin lispro (HUMALOG) 100 unit/mL kwikpen 12 Units by SubCUTAneous route.  liraglutide (VICTOZA) 0.6 mg/0.1 mL (18 mg/3 mL) pnij 1.2 mg by SubCUTAneous route.  metFORMIN (GLUCOPHAGE) 1,000 mg tablet Take 1,000 mg by mouth two (2) times a day.       diclofenac (VOLTAREN) 1 % gel Apply  to affected area four (4) times daily. 100 g 4    lisinopriL (PRINIVIL, ZESTRIL) 20 mg tablet Take 20 mg by mouth daily.  aspirin 81 mg chewable tablet Take 81 mg by mouth daily.  amitriptyline (ELAVIL) 50 mg tablet Take  by mouth nightly. (Patient not taking: Reported on 3/3/2022)      atorvastatin (LIPITOR) 20 mg tablet Take 20 mg by mouth daily.  SITagliptin (Januvia) 100 mg tablet Take 100 mg by mouth daily. (Patient not taking: Reported on 3/3/2022)      insulin glargine,hum.rec.anlog (LANTUS SC) 60 Units by SubCUTAneous route nightly.  insulin aspart protamine/insulin aspart (NovoLOG Mix 70-30FlexPen U-100) 100 unit/mL (70-30) inpn by SubCUTAneous route three (3) times daily. ALLERGIES  No Known Allergies       SOCIAL HISTORY    Social History     Socioeconomic History    Marital status:    Tobacco Use    Smoking status: Former Smoker    Smokeless tobacco: Never Used   Vaping Use    Vaping Use: Never used   Substance and Sexual Activity    Alcohol use: Never    Drug use: Never       FAMILY HISTORY  No family history on file. PHYSICAL EXAMINATION  Visit Vitals  BP (!) 152/73 (BP 1 Location: Left upper arm, BP Patient Position: Sitting, BP Cuff Size: Adult) Comment: pt asymptomatic, MD aware   Pulse 69   Temp 96.8 °F (36 °C) (Temporal)   Ht 5' 2\" (1.575 m)   Wt 186 lb (84.4 kg)   SpO2 100% Comment: RA   BMI 34.02 kg/m²       Pain Assessment  4/6/2022   Location of Pain Leg   Location Modifiers Left;Right   Severity of Pain 9   Quality of Pain Throbbing; Sharp;Dull;Aching;Burning; Other (Comment)   Quality of Pain Comment electric shock, pressure, n/t   Duration of Pain Persistent   Frequency of Pain Constant   Aggravating Factors Other (Comment)   Aggravating Factors Comment wearing anything on my legs   Limiting Behavior Yes   Relieving Factors Nothing   Result of Injury No           Constitutional:  Well developed, well nourished, in no acute distress. Psychiatric: Affect and mood are appropriate. Integumentary: No rashes or abrasions noted on exposed areas. SPINE/MUSCULOSKELETAL EXAM    On brief examination: Severe pain in BLE with all movements.       NCV & EMG Findings:  Nerve Conduction Studies  Anti Sensory Summary Table     Stim Site NR Peak (ms) Norm Peak (ms) O-P Amp (µV) Norm O-P Amp Site1 Site2 Delta-P (ms) Dist (cm) Jaylon (m/s) Norm Jaylon (m/s)   Left Sup Fibular Anti Sensory (Ant Lat Mall)   14 cm NR  <4.4  >5.0 14 cm Ant Lat Mall  14.0  >32   Right Sup Fibular Anti Sensory (Ant Lat Mall)   14 cm NR 0.7 <4.4 0.3 >5.0 14 cm Ant Lat Mall 0.7 14.0 200 >32   Site 2 NR 3.4  4.8          Left Sural Anti Sensory (Lat Mall)   Calf NR  <4.5  >4.0 Calf Lat Mall  14.0  >35   Right Sural Anti Sensory (Lat Mall)   Calf NR  <4.5  >4.0 Calf Lat Mall  14.0  >35   Site 2 NR               Motor Summary Table     Stim Site NR Onset (ms) Norm Onset (ms) O-P Amp (mV) Norm O-P Amp Site1 Site2 Delta-0 (ms) Dist (cm) Jaylon (m/s) Norm Jaylon (m/s)   Left Fibular Motor (Ext Dig Brev)   Ankle    4.7 <6.5 1.6 >1.3 B Fib Ankle 7.0 27.5 39 >38   B Fib    11.7  1.4  Poplt B Fib 1.0 7.0 70 >40   Poplt    12.7  1.3          Right Fibular Motor (Ext Dig Brev)   Ankle    4.1 <6.5 1.3 >1.3 B Fib Ankle 6.2 28.0 45 >38   B Fib    10.3  1.0  Poplt B Fib 1.6 7.0 44 >40   Poplt    11.9  1.1          Left Tibial Motor (Abd Godoy Brev)   Ankle    3.9 <6.1 3.4 >4.4 Knee Ankle 9.5 32.0 34 >39   Knee    13.4  1.8          Right Tibial Motor (Abd Godoy Brev)   Ankle    4.1 <6.1 3.5 >4.4 Knee Ankle 9.2 32.0 35 >39   Knee    13.3  1.8            EMG     Side Muscle Nerve Root Ins Act Fibs Psw Amp Dur Poly Recrt Int Hossein Hey Comment   Right VastusMed Femoral L2-4 Nml Nml Nml Nml Nml 0 Nml Nml    Right AntTibialis Dp Br Fibular L4-5 Incr 1+ 1+ Nml Nml 2+ Nml Nml CRD   Right Fibularis Long Sup Br Fibular L5-S1 Nml Nml Nml Nml Nml 2+ Nml Nml    Right Gastroc Tibial S1-2 Nml Nml Nml Nml Nml 0 Nml Nml    Left VastusMed Femoral L2-4 Nml Nml Nml Nml Nml 0 Nml Nml    Left AntTibialis Dp Br Fibular L4-5 Nml Nml Nml Nml Nml 2+ Nml Nml CRD   Left Fibularis Long Sup Br Fibular L5-S1 Nml Nml Nml Nml Nml 1+ Nml Nml    Left Gastroc Tibial S1-2 Nml Nml Nml Nml Nml 0 Nml Nml    Right PostTibialis Tibial L5, S1 Nml Nml Nml Nml Nml 0 Nml Nml    Right AdductorLong Obturator L2-4 Nml Nml Nml Nml Nml 0 Nml Nml    Right AbdHallucis MedPlantar S1-2 Nml Nml Nml Nml Nml 2+ Nml Nml    Left PostTibialis Tibial L5, S1 Incr 1+ 1+ Nml Nml 0 Nml Nml    Left AdductorLong Obturator L2-4 Nml Nml Nml Nml Nml 0 Nml Nml    Left AbdHallucis MedPlantar S1-2 Nml Nml Nml Incr Nml 2+ Nml Nml        Nerve Conduction Studies  Anti Sensory Left/Right Comparison     Stim Site L Lat (ms) R Lat (ms) L-R Lat (ms) L Amp (µV) R Amp (µV) L-R Amp (%) Site1 Site2 L Jaylon (m/s) R Jaylon (m/s) L-R Jaylon (m/s)   Sup Fibular Anti Sensory (Ant Lat Mall)   14 cm  0.7   0.3  14 cm Ant Lat Mall  200    Sural Anti Sensory (Lat Mall)   Calf       Calf Lat Mall        Motor Left/Right Comparison     Stim Site L Lat (ms) R Lat (ms) L-R Lat (ms) L Amp (mV) R Amp (mV) L-R Amp (%) Site1 Site2 L Jaylon (m/s) R Jaylon (m/s) L-R Jaylon (m/s)   Fibular Motor (Ext Dig Brev)   Ankle 4.7 4.1 0.6 1.6 1.3 18.8 B Fib Ankle 39 45 6   B Fib 11.7 10.3 1.4 1.4 1.0 28.6 Poplt B Fib 70 44 26   Poplt 12.7 11.9 0.8 1.3 1.1 15.4        Tibial Motor (Abd Godoy Brev)   Ankle 3.9 4.1 0.2 3.4 3.5 2.9 Knee Ankle 34 35 1   Knee 13.4 13.3 0.1 1.8 1.8 0.0              Waveforms:                            [facility]  OFFICE PROCEDURE PROGRESS NOTE        Chart reviewed for the following:   Germán WHITFIEDL, have reviewed the History, Physical and updated the Allergic reactions for Lorrie Edjossy     TIME OUT performed immediately prior to start of procedure:   Germán WHITFIELD, have performed the following reviews on Lorrie Doshi prior to the start of the procedure:            * Patient was identified by name and date of birth   * Agreement on procedure being performed was verified  * Risks and Benefits explained to the patient  * Procedure site verified and marked as necessary  * Patient was positioned for comfort  * Consent was signed and verified     Time: 12:08 PM    Date of procedure: 4/6/2022    Procedure performed by:  Ray Porras MD    Provider accompanied by: Derrell. Patient accompanied by: Self.     How tolerated by patient: tolerated the procedure well with no complications    Post Procedural Pain Scale: 2 - Hurts Little Bit    Comments: none    Written by Filomena Farley, [address] [place] [address] [address] as dictated by Donna Vasquez MD

## 2022-04-06 NOTE — TELEPHONE ENCOUNTER
Pt just had her emg and is in excruciating pain in her legs constant pain no relief is there anything else she can do please call pt to advise

## 2022-04-06 NOTE — TELEPHONE ENCOUNTER
Patient contacted and given instructions on how to ramp up the medication. She expressed her understanding.

## 2022-04-06 NOTE — LETTER
4/6/2022    Patient: Macario Villafuerte   YOB: 1955   Date of Visit: 4/6/2022     Kelli Gannon MD  [address] [place] [address] [address] [address]  Via Fax: [phone]    Dear Kelli Gannon MD,      Thank you for referring Ms. Macario Villafuerte to [facility] for evaluation. My notes for this consultation are attached. If you have questions, please do not hesitate to call me. I look forward to following your patient along with you.       Sincerely,    Felipe Zazueta MD

## 2022-04-13 NOTE — PROGRESS NOTES
[facility]  [address], [place]   Phone: [phone]  Fax: [phone]        PROGRESS NOTE      HISTORY OF PRESENT ILLNESS:  The patient is a 77 y.o. female and was seen today for follow up of BLE pain from her thighs in a L4 distribution to her toes. Her pain is not exacerbated positionally. Pt reports limitations with walking. Pt admits to chronic hx of BUE and BLE paresthesias. Pt reports she initially complained of LLE pain x 9/2021 without injury. RLE sxs started a week after her visit with Dr. Susi Sellers on 1/27/2022. Denies much in the way of back pain. Pt had MRI done 8/11/2021 prior to reported pain complaint duration, pt states she had been sent to the ER on 8/11/2021 by her PCP secondary to left hip pain. Pt admits she is taking Neurontin 300 mg TID without relief through Juan Darden MD x 1 month. Pt is tolerating the medication well, Pt admits she underwent previous spinal surgery on 1/8/1998 through Dr. Saida Priest MD with good relief. Pt states she had been pain free up until last year. Patient denies previous spinal injections, or physical therapy/chiropractic care. Pt denies change in bowel or bladder habits. Pt denies fever, weight loss, or skin changes. Pt denies h/o stomach ulcers or bleeding disorders. The patient is RHD. PmHx of epilepsy, HTN, DM, Asthma, spinal surgery (L4-L5-S1 posterior fusion). Pt admits her blood sugars occasionally run above 200. Denies being followed for her DM. Note from Dr. Susi Sellers dated 1/27/2022 indicating patient was seen with c/o knee and back pain with radicular pain into the LLE to the toes. Worse with standing. Improved with sitting. End stage osteoarthritsi of bilateral knees. Seh endorsed left hip pain that he offered to injection, pt declined. Referred to hip PT. Referred to spine. Lumbar spine MRI dated 8/11/2021 films independently reviewed.  Per report, stenosis/impingement: Central stenosis:  None significant. Lateral recess stenosis:  Low-grade left L1-L2. Foraminal stenosis:  None significant. Structure: L4-L5-S1 posterior fusion and decompression with solid osseous union. Advanced L2-L3 degenerative facet arthropathy with low-grade L2 anterolisthesis. Moderate disc degeneration T12-L3. At her last clinical appointment, pt reported her blood sugars run above 200, I was unable explain her sxs based on her L spine MRI. I suspected her sxs may be related to neuropathy. I ordered a BLE EMG for further evaluation of her sxs. I will increase her Neurontin 300 mg TID to 600 mg TID. Patient advised to call office if intolerant to higher dose.         The patient returns today with BLE pain from her mid thighs distally, pain is worst distally. She rates her pain 10/10, previously 9/10. She c/o severe right knee pain, follwowed by Dr. Sejal Flaherty. Pt was increased to Neurontin 800 mg TID on 4/6/2022. Pt is tolerating the Neurontin 800 mg TID without relief. She denies any low back pain. Pt denies change in bowel or bladder habits. Pt admits her blood sugars occasionally run around 200, dx with DM 20 years ago. A BLE EMG dated 4/6/2022 by Dr. Imelda Mills was suggestive of  1. Severe chronic sensorimotor peripheral polyneuropathy - this is based on abnormalities throughout the NCS bilaterally. There are scattered signs of chronicity and mild muscle membrane instability. Note from Dr. Sejal Flaherty dated 3/10/2022 indicating patient was seen for reevaluation of low back w/ adicular pain. Dx with endstage osteoarthritis of R knee.  reviewed. Body mass index is 34.39 kg/m².     PCP: Rubio Lua MD      Past Medical History:   Diagnosis Date    Arthritis     Asthma     Diabetes ([address]     Epilepsy ([address]     Epilepsy ([address]     Hypertension     Seizures ([address]         Social History     Socioeconomic History    Marital status:      Spouse name: Not on file    Number of children: Not on file    Years of education: Not on file    Highest education level: Not on file   Occupational History    Not on file   Tobacco Use    Smoking status: Former Smoker    Smokeless tobacco: Never Used   Vaping Use    Vaping Use: Never used   Substance and Sexual Activity    Alcohol use: Never    Drug use: Never    Sexual activity: Not on file   Other Topics Concern    Not on file   Social History Narrative    Not on file     Social Determinants of Health     Financial Resource Strain:     Difficulty of Paying Living Expenses: Not on file   Food Insecurity:     Worried About [address] in the Last Year: Not on file    Stephanie of Food in the Last Year: Not on file   Transportation Needs:     Lack of Transportation (Medical): Not on file    Lack of Transportation (Non-Medical): Not on file   Physical Activity:     Days of Exercise per Week: Not on file    Minutes of Exercise per Session: Not on file   Stress:     Feeling of Stress : Not on file   Social Connections:     Frequency of Communication with Friends and Family: Not on file    Frequency of Social Gatherings with Friends and Family: Not on file    Attends Sabianism Services: Not on file    Active Member of [address] [place] [address] or Organizations: Not on file    Attends Club or Organization Meetings: Not on file    Marital Status: Not on file   Intimate Partner Violence:     Fear of Current or Ex-Partner: Not on file    Emotionally Abused: Not on file    Physically Abused: Not on file    Sexually Abused: Not on file   Housing Stability:     Unable to Pay for Housing in the Last Year: Not on file    Number of [place] in the Last Year: Not on file    Unstable Housing in the Last Year: Not on file       Current Outpatient Medications   Medication Sig Dispense Refill    cholecalciferol (VITAMIN D3) (50,000 UNITS /1250 MCG) capsule TAKE 1 CAPSULE BY MOUTH ONCE A WEEK      gabapentin (Neurontin) 800 mg tablet Take 1 Tablet by mouth three (3) times daily.  Max Daily Amount: 2,400 mg. 90 Tablet 1    traZODone (DESYREL) 50 mg tablet TAKE 1 TABLET BY MOUTH EVERY DAY AT BEDTIME      ergocalciferol (ERGOCALCIFEROL) 1,250 mcg (50,000 unit) capsule Take 50,000 Units by mouth every seven (7) days.  insulin lispro (HUMALOG) 100 unit/mL kwikpen 12 Units by SubCUTAneous route.  liraglutide (VICTOZA) 0.6 mg/0.1 mL (18 mg/3 mL) pnij 1.2 mg by SubCUTAneous route.  metFORMIN (GLUCOPHAGE) 1,000 mg tablet Take 1,000 mg by mouth two (2) times a day.  diclofenac (VOLTAREN) 1 % gel Apply  to affected area four (4) times daily. 100 g 4    lisinopriL (PRINIVIL, ZESTRIL) 20 mg tablet Take 20 mg by mouth daily.  aspirin 81 mg chewable tablet Take 81 mg by mouth daily.  atorvastatin (LIPITOR) 20 mg tablet Take 20 mg by mouth daily.  insulin glargine,hum.rec.anlog (LANTUS SC) 60 Units by SubCUTAneous route nightly.  insulin aspart protamine/insulin aspart (NovoLOG Mix 70-30FlexPen U-100) 100 unit/mL (70-30) inpn by SubCUTAneous route three (3) times daily. No Known Allergies       PHYSICAL EXAMINATION    Visit Vitals  Pulse 67   Temp 97.8 °F (36.6 °C) (Temporal)   Ht 5' 2\" (1.575 m)   Wt 188 lb (85.3 kg)   SpO2 98%   BMI 34.39 kg/m²       CONSTITUTIONAL: NAD, A&O x 3  SENSATION: Decreased sensation to light touch on the RLE from the mid thigh distally and on the LLE from the area proximal to the knee distally. Otherwise, intact to light touch throughout  RANGE OF MOTION: The patient has full passive range of motion in all four extremities. MOTOR:  Straight Leg Raise: Negative, bilaterally     Examined in a wheelchair               Hip Flex Knee Ext Knee Flex Ankle DF GTE Ankle PF Tone   Right +4/5 +4/5 +4/5 +4/5 +4/5 +4/5 +4/5   Left +4/5 +4/5 +4/5 +4/5 +4/5 +4/5 +4/5       ASSESSMENT   Diagnoses and all orders for this visit:    1. Facet arthropathy    2. Lumbar radiculopathy    3. Lumbar pain    4. Lumbar neuritis    5.  Peripheral polyneuropathy      IMPRESSION AND PLAN:  Patient returns to the office today with c/o BLE pain from her mid thighs distally, pain is worst distally. Multiple treatment options were discussed. I do not appreciate significant spinal stenosis on L spine MRI. Sxs are consistent with neuropathy which was confirmed by EMG. She reports mininal low back pain complaints. I do not appreciate surgical pathology based on MRI and do not think blocks would be beneficial. I will wean her off the Neurontin 800 mg TID secondary to no relief. I will try her on Lyrica 75 mg BID. The risks, benefits, and potential side effects of this medication were discussed. Patient understands and wishes to proceed. Patient advised to call the office if intolerant to new medication. I recommended her to /u with her PCP secondary to her neuropathy. Patient is neurologically intact. I will see the patient back in 6 week's time or earlier if needed. Written by Goldie Chaves, as dictated by Burnadette Goodell, MD  I examined the patient, reviewed and agree with the note.

## 2022-04-14 ENCOUNTER — OFFICE VISIT (OUTPATIENT)
Dept: ORTHOPEDIC SURGERY | Age: 67
End: 2022-04-14
Payer: MEDICARE

## 2022-04-14 VITALS
HEIGHT: 62 IN | HEART RATE: 67 BPM | TEMPERATURE: 97.8 F | OXYGEN SATURATION: 98 % | BODY MASS INDEX: 34.6 KG/M2 | WEIGHT: 188 LBS

## 2022-04-14 DIAGNOSIS — M47.819 FACET ARTHROPATHY: Primary | ICD-10-CM

## 2022-04-14 DIAGNOSIS — M54.16 LUMBAR RADICULOPATHY: ICD-10-CM

## 2022-04-14 DIAGNOSIS — M54.16 LUMBAR NEURITIS: ICD-10-CM

## 2022-04-14 DIAGNOSIS — G62.9 PERIPHERAL POLYNEUROPATHY: ICD-10-CM

## 2022-04-14 DIAGNOSIS — M54.50 LUMBAR PAIN: ICD-10-CM

## 2022-04-14 PROCEDURE — 3017F COLORECTAL CA SCREEN DOC REV: CPT | Performed by: PHYSICAL MEDICINE & REHABILITATION

## 2022-04-14 PROCEDURE — G8432 DEP SCR NOT DOC, RNG: HCPCS | Performed by: PHYSICAL MEDICINE & REHABILITATION

## 2022-04-14 PROCEDURE — G8417 CALC BMI ABV UP PARAM F/U: HCPCS | Performed by: PHYSICAL MEDICINE & REHABILITATION

## 2022-04-14 PROCEDURE — 1101F PT FALLS ASSESS-DOCD LE1/YR: CPT | Performed by: PHYSICAL MEDICINE & REHABILITATION

## 2022-04-14 PROCEDURE — G8427 DOCREV CUR MEDS BY ELIG CLIN: HCPCS | Performed by: PHYSICAL MEDICINE & REHABILITATION

## 2022-04-14 PROCEDURE — G8536 NO DOC ELDER MAL SCRN: HCPCS | Performed by: PHYSICAL MEDICINE & REHABILITATION

## 2022-04-14 PROCEDURE — G8399 PT W/DXA RESULTS DOCUMENT: HCPCS | Performed by: PHYSICAL MEDICINE & REHABILITATION

## 2022-04-14 PROCEDURE — G9899 SCRN MAM PERF RSLTS DOC: HCPCS | Performed by: PHYSICAL MEDICINE & REHABILITATION

## 2022-04-14 PROCEDURE — 99214 OFFICE O/P EST MOD 30 MIN: CPT | Performed by: PHYSICAL MEDICINE & REHABILITATION

## 2022-04-14 PROCEDURE — 1090F PRES/ABSN URINE INCON ASSESS: CPT | Performed by: PHYSICAL MEDICINE & REHABILITATION

## 2022-04-14 RX ORDER — ASPIRIN 325 MG
TABLET, DELAYED RELEASE (ENTERIC COATED) ORAL
COMMUNITY
Start: 2022-04-06

## 2022-04-14 RX ORDER — PREGABALIN 75 MG/1
75 CAPSULE ORAL 2 TIMES DAILY
Qty: 60 CAPSULE | Refills: 1 | Status: SHIPPED | OUTPATIENT
Start: 2022-04-14 | End: 2022-05-11

## 2022-04-14 NOTE — LETTER
4/14/2022    Patient: Inocencia Bailey   YOB: 1955   Date of Visit: 4/14/2022     Alexandre Harris MD  [address] [address]  Via Fax: [phone]    Dear Alexandre Harris MD,      Thank you for referring Ms. Inocencia Bailey to [facility] for evaluation. My notes for this consultation are attached. If you have questions, please do not hesitate to call me. I look forward to following your patient along with you.       Sincerely,    Mahad Colorado MD

## 2022-04-14 NOTE — LETTER
4/14/2022 10:44 AM    Patient:  Cliff Mayen   YOB: 1955  Date of Visit: 4/14/2022      Dear Erica Min MD  [address] [place]: Thank you for referring Ms. Cliff Mayen to me for evaluation/treatment. Below are the relevant portions of my assessment and plan of care. Progress note dated 4/14/2022    HPI:    The patient returns today with BLE pain from her mid thighs distally, pain is worst distally. She rates her pain 10/10, previously 9/10. She c/o severe right knee pain, follwowed by Dr. Yoselin Hyman. Pt was increased to Neurontin 800 mg TID on 4/6/2022. Pt is tolerating the Neurontin 800 mg TID without relief. She denies any low back pain. Pt denies change in bowel or bladder habits. Pt admits her blood sugars occasionally run around 200, dx with DM 20 years ago. A BLE EMG dated 4/6/2022 by Dr. Davin Estrella was suggestive of  1. Severe chronic sensorimotor peripheral polyneuropathy - this is based on abnormalities throughout the NCS bilaterally. There are scattered signs of chronicity and mild muscle membrane instability. Note from Dr. Yoselin Hyman dated 3/10/2022 indicating patient was seen for reevaluation of low back w/ adicular pain. Dx with endstage osteoarthritis of R knee. IMPRESSION AND PLAN:  Patient returns to the office today with c/o BLE pain from her mid thighs distally, pain is worst distally. Multiple treatment options were discussed. I do not appreciate significant spinal stenosis on L spine MRI. Sxs are consistent with neuropathy which was confirmed by EMG. She reports mininal low back pain complaints. I do not appreciate surgical spinal pathology based on MRI and do not think blocks would be beneficial. I will wean her off the Neurontin 800 mg TID secondary to no relief. I will try her on Lyrica 75 mg BID. The risks, benefits, and potential side effects of this medication were discussed. Patient understands and wishes to proceed. Patient was advised to call the office if intolerant to new medication. I recommended her to /u with her PCP secondary to her neuropathy. Patient is neurologically intact. I will see the patient back in 6 week's time or earlier if needed. If you have questions, please do not hesitate to call me. I look forward to following Ms. Moises Millan along with you.         Sincerely,      Boni Smiley MD

## 2022-05-11 DIAGNOSIS — M54.16 LUMBAR RADICULOPATHY: ICD-10-CM

## 2022-05-11 DIAGNOSIS — M47.819 FACET ARTHROPATHY: Primary | ICD-10-CM

## 2022-05-11 DIAGNOSIS — M54.16 LUMBAR NEURITIS: ICD-10-CM

## 2022-05-11 RX ORDER — PREGABALIN 150 MG/1
150 CAPSULE ORAL 2 TIMES DAILY
Qty: 60 CAPSULE | Refills: 1 | Status: SHIPPED | OUTPATIENT
Start: 2022-05-11 | End: 2022-05-26 | Stop reason: SDUPTHER

## 2022-05-11 NOTE — TELEPHONE ENCOUNTER
Patient called stating that the pain pill isn't working & will like to up the dosage, patient stated that the doctor informed her if the pain medication isn't working that he will up the dosage.  Patient contact [phone]          pregabalin (Lyrica) 75 mg capsule

## 2022-05-12 ENCOUNTER — OFFICE VISIT (OUTPATIENT)
Dept: ORTHOPEDIC SURGERY | Age: 67
End: 2022-05-12
Payer: MEDICARE

## 2022-05-12 VITALS — OXYGEN SATURATION: 100 % | TEMPERATURE: 97.3 F | HEART RATE: 76 BPM

## 2022-05-12 DIAGNOSIS — M79.89 SWELLING OF RIGHT LOWER EXTREMITY: ICD-10-CM

## 2022-05-12 DIAGNOSIS — G89.29 BILATERAL CHRONIC KNEE PAIN: ICD-10-CM

## 2022-05-12 DIAGNOSIS — M25.562 BILATERAL CHRONIC KNEE PAIN: ICD-10-CM

## 2022-05-12 DIAGNOSIS — M17.11 PRIMARY OSTEOARTHRITIS OF RIGHT KNEE: Primary | ICD-10-CM

## 2022-05-12 DIAGNOSIS — M17.12 PRIMARY OSTEOARTHRITIS OF LEFT KNEE: ICD-10-CM

## 2022-05-12 DIAGNOSIS — M25.561 BILATERAL CHRONIC KNEE PAIN: ICD-10-CM

## 2022-05-12 DIAGNOSIS — I87.8 VENOUS STASIS: ICD-10-CM

## 2022-05-12 PROCEDURE — 73564 X-RAY EXAM KNEE 4 OR MORE: CPT | Performed by: ORTHOPAEDIC SURGERY

## 2022-05-12 PROCEDURE — 99214 OFFICE O/P EST MOD 30 MIN: CPT | Performed by: ORTHOPAEDIC SURGERY

## 2022-05-12 PROCEDURE — G8399 PT W/DXA RESULTS DOCUMENT: HCPCS | Performed by: ORTHOPAEDIC SURGERY

## 2022-05-12 PROCEDURE — G8432 DEP SCR NOT DOC, RNG: HCPCS | Performed by: ORTHOPAEDIC SURGERY

## 2022-05-12 PROCEDURE — 1090F PRES/ABSN URINE INCON ASSESS: CPT | Performed by: ORTHOPAEDIC SURGERY

## 2022-05-12 PROCEDURE — 1101F PT FALLS ASSESS-DOCD LE1/YR: CPT | Performed by: ORTHOPAEDIC SURGERY

## 2022-05-12 PROCEDURE — G8427 DOCREV CUR MEDS BY ELIG CLIN: HCPCS | Performed by: ORTHOPAEDIC SURGERY

## 2022-05-12 PROCEDURE — G8536 NO DOC ELDER MAL SCRN: HCPCS | Performed by: ORTHOPAEDIC SURGERY

## 2022-05-12 PROCEDURE — 3017F COLORECTAL CA SCREEN DOC REV: CPT | Performed by: ORTHOPAEDIC SURGERY

## 2022-05-12 PROCEDURE — G9899 SCRN MAM PERF RSLTS DOC: HCPCS | Performed by: ORTHOPAEDIC SURGERY

## 2022-05-12 PROCEDURE — G8417 CALC BMI ABV UP PARAM F/U: HCPCS | Performed by: ORTHOPAEDIC SURGERY

## 2022-05-12 NOTE — PROGRESS NOTES
Patient: Adrian Hurley                MRN: [id number]       SSN: [id number]  YOB: 1955        AGE: 77 y.o. SEX: female  There is no height or weight on file to calculate BMI. PCP: Bing Hernandez MD  05/12/22    Fabiola Confer returns reevaluation of hip pain knee pain her right knee is bothering her the most the the back is quiescent she gets some radiculopathy she also notes that occasionally the lower extremity can be slightly bluish and denies any shortness of breath chest pain or calf pain she also reports some posterior knee pain sometimes the pain can be radicular from her back but is really the right knee it is bothering her the most recently. The examination today's very nice lady she is using her wheeled walker with a seat and doing very well the right knee is in varus itself. Couple degree fixed flexion deformity bends well is about half plus pitting edema slight evidence of neuropathy with sharp testing and both feet are warm and well-perfused calf nontender Homans' sign is negative.     To slight effusion in the knee x-rays were done today for both knees AP tunnel lateral and skyline on 5/12/2022 confirming severe arthritis right knee    She is not keen on injections are really do not work well for her    There was a discussion regarding surgery and was decided that it is recommended I would recommend a right knee replacement I also recommend a vascular consultation ahead of time which will obtain    Risks and benefits described including but not limited to infection DVT pulmonary embolism anesthetic complications blood loss requiring transfusion chronic pain instability implant longevity arthrofibrosis and also the need for bending and straightening knee on an hourly basis to avoid complications    REVIEW OF SYSTEMS:      CON: negative  EYE: negative   ENT: negative  RESP: negative  GI:    negative   :  negative  MSK: Positive  A twelve point review of systems was completed, positives noted and all other systems were reviewed and are negative          Past Medical History:   Diagnosis Date    Arthritis     Asthma     Diabetes [facility]     Epilepsy ([facility]     Epilepsy ([facility]     Hypertension     Seizures ([facility]        History reviewed. No pertinent family history. Current Outpatient Medications   Medication Sig Dispense Refill    pregabalin (Lyrica) 150 mg capsule Take 1 Capsule by mouth two (2) times a day. Max Daily Amount: 300 mg. 60 Capsule 1    cholecalciferol (VITAMIN D3) (50,000 UNITS /1250 MCG) capsule TAKE 1 CAPSULE BY MOUTH ONCE A WEEK      gabapentin (Neurontin) 800 mg tablet Take 1 Tablet by mouth three (3) times daily. Max Daily Amount: 2,400 mg. 90 Tablet 1    traZODone (DESYREL) 50 mg tablet TAKE 1 TABLET BY MOUTH EVERY DAY AT BEDTIME      ergocalciferol (ERGOCALCIFEROL) 1,250 mcg (50,000 unit) capsule Take 50,000 Units by mouth every seven (7) days.  insulin lispro (HUMALOG) 100 unit/mL kwikpen 12 Units by SubCUTAneous route.  liraglutide (VICTOZA) 0.6 mg/0.1 mL (18 mg/3 mL) pnij 1.2 mg by SubCUTAneous route.  metFORMIN (GLUCOPHAGE) 1,000 mg tablet Take 1,000 mg by mouth two (2) times a day.  diclofenac (VOLTAREN) 1 % gel Apply  to affected area four (4) times daily. 100 g 4    lisinopriL (PRINIVIL, ZESTRIL) 20 mg tablet Take 20 mg by mouth daily.  aspirin 81 mg chewable tablet Take 81 mg by mouth daily.  atorvastatin (LIPITOR) 20 mg tablet Take 20 mg by mouth daily.  insulin glargine,hum.rec.anlog (LANTUS SC) 60 Units by SubCUTAneous route nightly.  insulin aspart protamine/insulin aspart (NovoLOG Mix 70-30FlexPen U-100) 100 unit/mL (70-30) inpn by SubCUTAneous route three (3) times daily.          No Known Allergies    Past Surgical History:   Procedure Laterality Date    HAND/FINGER SURGERY UNLISTED Right 1996    right thumb    HX BACK SURGERY  1998    2 rods 6 screws    HX KNEE ARTHROSCOPY Right 2002 Social History     Socioeconomic History    Marital status:      Spouse name: Not on file    Number of children: Not on file    Years of education: Not on file    Highest education level: Not on file   Occupational History    Not on file   Tobacco Use    Smoking status: Former Smoker    Smokeless tobacco: Never Used   Vaping Use    Vaping Use: Never used   Substance and Sexual Activity    Alcohol use: Never    Drug use: Never    Sexual activity: Not on file   Other Topics Concern    Not on file   Social History Narrative    Not on file     Social Determinants of Health     Financial Resource Strain:     Difficulty of Paying Living Expenses: Not on file   Food Insecurity:     Worried About [address] in the Last Year: Not on file    [address] in the Last Year: Not on file   Transportation Needs:     Lack of Transportation (Medical): Not on file    Lack of Transportation (Non-Medical):  Not on file   Physical Activity:     Days of Exercise per Week: Not on file    Minutes of Exercise per Session: Not on file   Stress:     Feeling of Stress : Not on file   Social Connections:     Frequency of Communication with Friends and Family: Not on file    Frequency of Social Gatherings with Friends and Family: Not on file    Attends Evangelical Services: Not on file    Active Member of [address] [place] [address] [employer] or Organizations: Not on file    Attends Club or Organization Meetings: Not on file    Marital Status: Not on file   Intimate Partner Violence:     Fear of Current or Ex-Partner: Not on file    Emotionally Abused: Not on file    Physically Abused: Not on file    Sexually Abused: Not on file   Housing Stability:     Unable to Pay for Housing in the Last Year: Not on file    Number of [place] in the Last Year: Not on file    Unstable Housing in the Last Year: Not on file       Visit Vitals  Pulse 76   Temp 97.3 °F (36.3 °C) (Temporal)   SpO2 100%         PHYSICAL EXAMINATION:  GENERAL: Alert and oriented x3, in no acute distress, well-developed, well-nourished, afebrile. HEART: No JVD. EYES: No scleral icterus   NECK: No significant lymphadenopathy   LUNGS: No respiratory compromise or indrawing  ABDOMEN: Soft, non-tender, non-distended. Note: This note was completed using voice recognition software. Any typographical/name errors or mistakes are unintentional.    Electronically signed by:  Félix Hill MD

## 2022-05-26 ENCOUNTER — OFFICE VISIT (OUTPATIENT)
Dept: ORTHOPEDIC SURGERY | Age: 67
End: 2022-05-26
Payer: MEDICARE

## 2022-05-26 VITALS
TEMPERATURE: 97.3 F | HEIGHT: 62 IN | OXYGEN SATURATION: 100 % | BODY MASS INDEX: 33.82 KG/M2 | HEART RATE: 71 BPM | WEIGHT: 183.8 LBS | RESPIRATION RATE: 20 BRPM

## 2022-05-26 DIAGNOSIS — M47.819 FACET ARTHROPATHY: ICD-10-CM

## 2022-05-26 DIAGNOSIS — G62.9 PERIPHERAL POLYNEUROPATHY: Primary | ICD-10-CM

## 2022-05-26 DIAGNOSIS — M54.16 LUMBAR RADICULOPATHY: ICD-10-CM

## 2022-05-26 DIAGNOSIS — M54.16 LUMBAR NEURITIS: ICD-10-CM

## 2022-05-26 PROCEDURE — 1090F PRES/ABSN URINE INCON ASSESS: CPT | Performed by: PHYSICAL MEDICINE & REHABILITATION

## 2022-05-26 PROCEDURE — 1123F ACP DISCUSS/DSCN MKR DOCD: CPT | Performed by: PHYSICAL MEDICINE & REHABILITATION

## 2022-05-26 PROCEDURE — 3017F COLORECTAL CA SCREEN DOC REV: CPT | Performed by: PHYSICAL MEDICINE & REHABILITATION

## 2022-05-26 PROCEDURE — G8536 NO DOC ELDER MAL SCRN: HCPCS | Performed by: PHYSICAL MEDICINE & REHABILITATION

## 2022-05-26 PROCEDURE — G8432 DEP SCR NOT DOC, RNG: HCPCS | Performed by: PHYSICAL MEDICINE & REHABILITATION

## 2022-05-26 PROCEDURE — 99214 OFFICE O/P EST MOD 30 MIN: CPT | Performed by: PHYSICAL MEDICINE & REHABILITATION

## 2022-05-26 PROCEDURE — G8417 CALC BMI ABV UP PARAM F/U: HCPCS | Performed by: PHYSICAL MEDICINE & REHABILITATION

## 2022-05-26 PROCEDURE — 1101F PT FALLS ASSESS-DOCD LE1/YR: CPT | Performed by: PHYSICAL MEDICINE & REHABILITATION

## 2022-05-26 PROCEDURE — G9899 SCRN MAM PERF RSLTS DOC: HCPCS | Performed by: PHYSICAL MEDICINE & REHABILITATION

## 2022-05-26 PROCEDURE — G8427 DOCREV CUR MEDS BY ELIG CLIN: HCPCS | Performed by: PHYSICAL MEDICINE & REHABILITATION

## 2022-05-26 PROCEDURE — G8399 PT W/DXA RESULTS DOCUMENT: HCPCS | Performed by: PHYSICAL MEDICINE & REHABILITATION

## 2022-05-26 RX ORDER — PREGABALIN 225 MG/1
225 CAPSULE ORAL 2 TIMES DAILY
Qty: 60 CAPSULE | Refills: 1 | Status: SHIPPED | OUTPATIENT
Start: 2022-05-26 | End: 2022-07-28

## 2022-05-26 NOTE — PROGRESS NOTES
[facility]  [address], [place]   Phone: [phone]  Fax: [phone]        PROGRESS NOTE      HISTORY OF PRESENT ILLNESS:  The patient is a 77 y.o. female and was seen today for follow up of BLE pain from her mid thighs distally, worst distally. Previously, she was seen for BLE pain from her thighs in a L4 distribution to her toes. Her pain is not exacerbated positionally. Pt reports limitations with walking. Pt admits to chronic hx of BUE and BLE paresthesias. Pt reports she initially complained of LLE pain x 9/2021 without injury. RLE sxs started a week after her visit with Dr. Keren Montalvo on 1/27/2022. Denies much in the way of back pain. Pt had MRI done 8/11/2021 prior to reported pain complaint duration, pt states she had been sent to the ER on 8/11/2021 by her PCP secondary to left hip pain. Pt admits she is taking Neurontin 300 mg TID without relief Hannah Dos Santos MD x 1 month. Pt is tolerating the medication well, Pt admits she underwent previous spinal surgery on 1/8/1998 through Dr. Jeanette Persaud MD with good relief. Pt states she had been pain free up until last year. Patient denies previous spinal injections, or physical therapy/chiropractic care. Pt denies change in bowel or bladder habits. Pt denies fever, weight loss, or skin changes. Pt denies h/o stomach ulcers or bleeding disorders. The patient is RHD. PmHx of epilepsy, HTN, DM, Asthma, spinal surgery (L4-L5-S1 posterior fusion). Pt admits her blood sugars occasionally run above 200. Denies being followed for her DM. Note from Dr. Ashok Fink indicating patient was seen with c/o knee and back pain with radicular pain into the LLE to the toes. Worse with standing. Improved with sitting. End stage osteoarthritis of bilateral knees. She endorsed left hip pain that he offered to injection, pt declined. Referred to hip PT.  Referred to spine. Lumbar spine MRI dated 8/11/2021 films independently reviewed. Per report, stenosis/impingement: Central stenosis:  None significant. Lateral recess stenosis:  Low-grade left L1-L2. Foraminal stenosis:  None significant. Structure: L4-L5-S1 posterior fusion and decompression with solid osseous union. Advanced L2-L3 degenerative facet arthropathy with low-grade L2 anterolisthesis. Moderate disc degeneration T12-L3. A BLE EMG dated 4/6/2022 by Dr. Maureen Villar was suggestive of  1. Severe chronic sensorimotor peripheral polyneuropathy - this is based on abnormalities throughout the NCS bilaterally. There are scattered signs of chronicity and mild muscle membrane instability. Note from Dr. Ariel Joaquin dated 3/10/2022 indicating patient was seen for reevaluation of low back w/ radicular pain. Dx with end stage osteoarthritis of R knee. At her last clinic appointment, I did not appreciate significant spinal stenosis on L spine MRI. Sxs were consistent with neuropathy which was confirmed by EMG. She reported minimal low back pain complaints. I did not appreciate surgical pathology based on MRI and did not think blocks would be beneficial. I weaned her off the Neurontin 800 mg TID secondary to no relief. I tried her on Lyrica 75 mg BID. The risks, benefits, and potential side effects of this medication were discussed. Patient was advised to call the office if intolerant to new medication. I recommended her to follow up with her PCP secondary to her neuropathy. The patient returns today reporting pain location and distribution remains unchanged, R>L. She rates her pain 5-9/10, previously 9/10. Pt endorses she was unable to tolerate NEURONTIN 800 mg TID, was weaned off Neurontin and started on Lyrica, and is currently taking Lyrica 150 mg BID since 05/11/2022 and tolerates it well. She reports an increase in pain after a long car ride.  Pt states that she has been diagnosed with arthritis in the right knee by Dr. Ariel Joaquin and is scheduled for knee replacement on 11/24/2022. She mentions edema in the right foot and notes severe edema following a car ride from [place] to [place]. She states that she was referred to a vascular specialist by Dr. Filippo Silva and is scheduled to follow up with the vascular specialist on 07/25/2022.  reviewed. Body mass index is 33.62 kg/m². PCP: Chantel Nolasco MD      Past Medical History:   Diagnosis Date    Arthritis     Asthma     [facility]     [facility]     Epilepsy [facility]     Hypertension     Seizures ([facility]         Social History     Socioeconomic History    Marital status:      Spouse name: Not on file    Number of children: Not on file    Years of education: Not on file    Highest education level: Not on file   Occupational History    Not on file   Tobacco Use    Smoking status: Former Smoker    Smokeless tobacco: Never Used   Vaping Use    Vaping Use: Never used   Substance and Sexual Activity    Alcohol use: Never    Drug use: Never    Sexual activity: Not on file   Other Topics Concern    Not on file   Social History Narrative    Not on file     Social Determinants of Health     Financial Resource Strain:     Difficulty of Paying Living Expenses: Not on file   Food Insecurity:     Worried About [address] in the Last Year: Not on file    [address] in the Last Year: Not on file   Transportation Needs:     Lack of Transportation (Medical): Not on file    Lack of Transportation (Non-Medical):  Not on file   Physical Activity:     Days of Exercise per Week: Not on file    Minutes of Exercise per Session: Not on file   Stress:     Feeling of Stress : Not on file   Social Connections:     Frequency of Communication with Friends and Family: Not on file    Frequency of Social Gatherings with Friends and Family: Not on file    Attends Zoroastrianism Services: Not on file    Active Member of Clubs or Organizations: Not on file    Attends Club or Organization Meetings: Not on file    Marital Status: Not on file   Intimate Partner Violence:     Fear of Current or Ex-Partner: Not on file    Emotionally Abused: Not on file    Physically Abused: Not on file    Sexually Abused: Not on file   Housing Stability:     Unable to Pay for Housing in the Last Year: Not on file    Number of Pk in the Last Year: Not on file    Unstable Housing in the Last Year: Not on file       Current Outpatient Medications   Medication Sig Dispense Refill    pregabalin (Lyrica) 150 mg capsule Take 1 Capsule by mouth two (2) times a day. Max Daily Amount: 300 mg. 60 Capsule 1    cholecalciferol (VITAMIN D3) (50,000 UNITS /1250 MCG) capsule TAKE 1 CAPSULE BY MOUTH ONCE A WEEK      traZODone (DESYREL) 50 mg tablet TAKE 1 TABLET BY MOUTH EVERY DAY AT BEDTIME      ergocalciferol (ERGOCALCIFEROL) 1,250 mcg (50,000 unit) capsule Take 50,000 Units by mouth every seven (7) days.  insulin lispro (HUMALOG) 100 unit/mL kwikpen 12 Units by SubCUTAneous route.  liraglutide (VICTOZA) 0.6 mg/0.1 mL (18 mg/3 mL) pnij 1.2 mg by SubCUTAneous route.  metFORMIN (GLUCOPHAGE) 1,000 mg tablet Take 1,000 mg by mouth two (2) times a day.  diclofenac (VOLTAREN) 1 % gel Apply  to affected area four (4) times daily. 100 g 4    lisinopriL (PRINIVIL, ZESTRIL) 20 mg tablet Take 20 mg by mouth daily.  aspirin 81 mg chewable tablet Take 81 mg by mouth daily.  atorvastatin (LIPITOR) 20 mg tablet Take 20 mg by mouth daily.  insulin glargine,hum.rec.anlog (LANTUS SC) 60 Units by SubCUTAneous route nightly.  insulin aspart protamine/insulin aspart (NovoLOG Mix 70-30FlexPen U-100) 100 unit/mL (70-30) inpn by SubCUTAneous route three (3) times daily.  gabapentin (Neurontin) 800 mg tablet Take 1 Tablet by mouth three (3) times daily.  Max Daily Amount: 2,400 mg. 90 Tablet 1       No Known Allergies       PHYSICAL EXAMINATION    Visit Vitals  Pulse 71   Temp 97.3 °F (36.3 °C) (Temporal)   Resp 20   Ht 5' 2\" (1.575 m)   Wt 183 lb 12.8 oz (83.4 kg)   SpO2 100%   BMI 33.62 kg/m²       CONSTITUTIONAL: NAD, A&O x 3  SENSATION: Intact to light touch throughout  NEURO: Jarad's is negative bilaterally. RANGE OF MOTION: The patient has full passive range of motion in all four extremities. MOTOR:  Straight Leg Raise: Negative, bilateral    Ambulates with a rolling walker with seat. Hip Flex Knee Ext Knee Flex Ankle DF GTE Ankle PF Tone   Right +4/5 +4/5 +4/5 +4/5 +4/5 +4/5 +4/5   Left +4/5 +4/5 +4/5 +4/5 +4/5 +4/5 +4/5       ASSESSMENT   Diagnoses and all orders for this visit:    1. Peripheral polyneuropathy    2. Facet arthropathy    3. Lumbar radiculopathy    4. Lumbar neuritis          IMPRESSION AND PLAN:  Patient returns to the office today with c/o BLE pain from her mid thighs distally, worst distally. Multiple treatment options were discussed. I will increase her dosage of Lyrica to 225 mg BID after she finishes her current prescription of 150 mg BID. Patient advised to call office if intolerant to higher dose. Patient is neurologically intact. I will see the patient back in 6 weeks' time or earlier if needed. Written by Shirley Ocampo, as dictated by Blanco Slaughter MD  I examined the patient, reviewed and agree with the note.

## 2022-07-27 NOTE — PROGRESS NOTES
[facility]  [address], [place]   Phone: [phone]  Fax: [phone]        PROGRESS NOTE      HISTORY OF PRESENT ILLNESS:  The patient is a 77 y.o. female and was seen today for follow up of BLE pain from her mid thighs distally, worst distally. R>L. Previously, she was seen for BLE pain from her thighs in a L4 distribution to her toes. Her pain is not exacerbated positionally. Pt reports limitations with walking. Pt admits to chronic hx of BUE and BLE paresthesias. Pt reports she initially complained of LLE pain x 9/2021 without injury. RLE sxs started a week after her visit with Dr. Alexi Brito on 1/27/2022. Denies much in the way of back pain. Pt had MRI done 8/11/2021 prior to reported pain complaint duration, pt states she had been sent to the ER on 8/11/2021 by her PCP secondary to left hip pain. Pt endorses she was unable to tolerate NEURONTIN 800 mg TID, was weaned off Neurontin and started on Lyrica. Pt admits she underwent previous spinal surgery on 1/8/1998 through Dr. Jennifer Hutchinson MD with good relief. Pt states she had been pain free up until last year. Pt states that she has been diagnosed with arthritis in the right knee by Dr. Alexi Brito and is scheduled for knee replacement on 11/24/2022. Patient denies previous spinal injections, or physical therapy/chiropractic care. Pt denies change in bowel or bladder habits. Pt denies fever, weight loss, or skin changes. Pt denies h/o stomach ulcers or bleeding disorders. The patient is RHD. PmHx of epilepsy, HTN, DM, Asthma, spinal surgery (L4-L5-S1 posterior fusion). Pt admits her blood sugars occasionally run above 200. Denies being followed for her DM. Note from Dr. Alexi Brito dated 1/27/2022 indicating patient was seen with c/o knee and back pain with radicular pain into the LLE to the toes. Worse with standing. Improved with sitting. End stage osteoarthritis of bilateral knees.  She endorsed left hip pain that he offered to injection, pt declined. Referred to hip PT. Referred to spine. Lumbar spine MRI dated 8/11/2021 films independently reviewed. Per report, stenosis/impingement: Central stenosis:  None significant. Lateral recess stenosis:  Low-grade left L1-L2. Foraminal stenosis:  None significant. Structure: L4-L5-S1 posterior fusion and decompression with solid osseous union. Advanced L2-L3 degenerative facet arthropathy with low-grade L2 anterolisthesis. Moderate disc degeneration T12-L3. A BLE EMG dated 4/6/2022 by Dr. Ronald Magana was suggestive of  1. Severe chronic sensorimotor peripheral polyneuropathy - this is based on abnormalities throughout the NCS bilaterally. There are scattered signs of chronicity and mild muscle membrane instability. Note from Dr. Parminder Garcia dated 3/10/2022 indicating patient was seen for reevaluation of low back w/ radicular pain. Dx with end stage osteoarthritis of R knee. At her last clinic appointment,  I increased her dosage of Lyrica to 225 mg BID after she finished her current prescription of 150 mg BID. Patient was advised to call office if intolerant to higher dose. The patient returns today with RLE pain from her right medial upper thigh to her foot and LLE pain from her left ankle and extended into the posterior calf. She rates her pain 10/10, previously 5-9/10. The pt is tolerating hte Lyrica 225 with initial improvement. Pt is scheduled to follow up with Dr. Parminder Garcia on 11/10/2022. Pending right knee replacement. Per pt, Dr. Parminder Garcia has checked her hips and he found no abnormalities. Last office visit, she had mentioned edema in the right foot and noted severe edema following a car ride from [place] to [facility]. She had been referred to a vascular specialist by Dr. Parminder Garcia and was scheduled to see a vascular specialist on 07/25/2022. Vascular specialist  Per pt, there does appear to be some occulusions, workup in progress.  Pt denies change in bowel or bladder habits.  reviewed. Body mass index is 33.65 kg/m². PCP: Anika Bryant MD      Past Medical History:   Diagnosis Date    Arthritis     Asthma     Diabetes ([facility]     Epilepsy ([facility]     Epilepsy ([facility]     Hypertension     Seizures ([facility]         Social History     Socioeconomic History    Marital status:      Spouse name: Not on file    Number of children: Not on file    Years of education: Not on file    Highest education level: Not on file   Occupational History    Not on file   Tobacco Use    Smoking status: Former    Smokeless tobacco: Never   Vaping Use    Vaping Use: Never used   Substance and Sexual Activity    Alcohol use: Never    Drug use: Never    Sexual activity: Not on file   Other Topics Concern    Not on file   Social History Narrative    Not on file     Social Determinants of Health     Financial Resource Strain: Not on file   Food Insecurity: Not on file   Transportation Needs: Not on file   Physical Activity: Not on file   Stress: Not on file   Social Connections: Not on file   Intimate Partner Violence: Not on file   Housing Stability: Not on file       Current Outpatient Medications   Medication Sig Dispense Refill    metoprolol tartrate (LOPRESSOR) 25 mg tablet Take 25 mg by mouth two (2) times a day. aspirin delayed-release 81 mg tablet Take 81 mg by mouth in the morning. pregabalin (LYRICA) 225 mg capsule Take 1 Capsule by mouth two (2) times a day. Max Daily Amount: 450 mg. 60 Capsule 1    cholecalciferol (VITAMIN D3) (50,000 UNITS /1250 MCG) capsule TAKE 1 CAPSULE BY MOUTH ONCE A WEEK      traZODone (DESYREL) 50 mg tablet TAKE 1 TABLET BY MOUTH EVERY DAY AT BEDTIME      ergocalciferol (ERGOCALCIFEROL) 1,250 mcg (50,000 unit) capsule Take 50,000 Units by mouth every seven (7) days. insulin lispro (HUMALOG) 100 unit/mL kwikpen 12 Units by SubCUTAneous route.      liraglutide (VICTOZA) 0.6 mg/0.1 mL (18 mg/3 mL) pnij 1.2 mg by SubCUTAneous route. metFORMIN (GLUCOPHAGE) 1,000 mg tablet Take 1,000 mg by mouth two (2) times a day. diclofenac (VOLTAREN) 1 % gel Apply  to affected area four (4) times daily. 100 g 4    lisinopriL (PRINIVIL, ZESTRIL) 20 mg tablet Take 20 mg by mouth daily. aspirin 81 mg chewable tablet Take 81 mg by mouth daily. atorvastatin (LIPITOR) 20 mg tablet Take 20 mg by mouth daily. insulin glargine,hum.rec.anlog (LANTUS SC) 60 Units by SubCUTAneous route nightly. insulin aspart protamine/insulin aspart (NOVOLOG MIX 70/30) 100 unit/mL (70-30) inpn by SubCUTAneous route three (3) times daily. gabapentin (Neurontin) 800 mg tablet Take 1 Tablet by mouth three (3) times daily. Max Daily Amount: 2,400 mg. 90 Tablet 1       No Known Allergies       REVIEW OF SYSTEMS  Constitutional symptoms: Negative  Eyes: Negative  Ears, Nose, Throat, and Mouth: Negative  Cardiovascular: Negative  Respiratory: Negative  Genitourinary: Negative  Integumentary (Skin and/or breast): Negative  Musculoskeletal: Positive for RLE pain from her right medial upper thigh to her foot and LLE pain from her left ankle and extended into the posterior calf  Extremities: Negative for edema. Endocrine/Rheumatologic: Negative  Hematologic/Lymphatic: Negative  Allergic/Immunologic: Negative  Psychiatric: Negative     PHYSICAL EXAMINATION    Visit Vitals  Pulse 71   Temp 97.1 °F (36.2 °C) (Temporal)   Resp 18   Ht 5' 2\" (1.575 m)   Wt 184 lb (83.5 kg)   SpO2 100% Comment: RA   BMI 33.65 kg/m²       CONSTITUTIONAL: NAD, A&O x 3  SENSATION: Decreased sensation to light touch on the RLE in a L4 distribution. Otherwise, intact to light touch throughout  RANGE OF MOTION: The patient has full passive range of motion in all four extremities.   MOTOR:  Straight Leg Raise: Negative, bilateral    Ambulates with a rolling walker with seat               Hip Flex Knee Ext Knee Flex Ankle DF GTE Ankle PF Tone   Right +4/5 +4/5 +4/5 +4/5 +4/5 +4/5 +4/5   Left +4/5 +4/5 +4/5 +4/5 +4/5 +4/5 +4/5       ASSESSMENT   Diagnoses and all orders for this visit:    1. Lumbar neuritis    2. Lumbar radiculopathy    3. Facet arthropathy    4. Peripheral polyneuropathy    5. Lumbar pain        IMPRESSION AND PLAN:  Patient returns to the office today with c/o  RLE pain from her right medial upper thigh to her foot and LLE pain from her left ankle and extended into the posterior calf. Multiple treatment options were discussed. I will increase her Lyrica 225 mg BID to 200 mg TID. Patient advised to call office if intolerant to higher dose. I will order a new L spine MRI to evaluate for any changes. I advised patient to bring copies of films to next visit. Patient is neurologically intact. I will see the patient back in 1 month's time or earlier if needed. Written by Lucy Banks, as dictated by Paolo Post MD  I examined the patient, reviewed and agree with the note.

## 2022-07-28 ENCOUNTER — OFFICE VISIT (OUTPATIENT)
Dept: ORTHOPEDIC SURGERY | Age: 67
End: 2022-07-28
Payer: MEDICARE

## 2022-07-28 VITALS
WEIGHT: 184 LBS | HEIGHT: 62 IN | TEMPERATURE: 97.1 F | RESPIRATION RATE: 18 BRPM | OXYGEN SATURATION: 100 % | BODY MASS INDEX: 33.86 KG/M2 | HEART RATE: 71 BPM

## 2022-07-28 DIAGNOSIS — G62.9 PERIPHERAL POLYNEUROPATHY: ICD-10-CM

## 2022-07-28 DIAGNOSIS — M54.16 LUMBAR RADICULOPATHY: ICD-10-CM

## 2022-07-28 DIAGNOSIS — M47.819 FACET ARTHROPATHY: ICD-10-CM

## 2022-07-28 DIAGNOSIS — M54.50 LUMBAR PAIN: ICD-10-CM

## 2022-07-28 DIAGNOSIS — M54.16 LUMBAR NEURITIS: Primary | ICD-10-CM

## 2022-07-28 PROCEDURE — 99214 OFFICE O/P EST MOD 30 MIN: CPT | Performed by: PHYSICAL MEDICINE & REHABILITATION

## 2022-07-28 PROCEDURE — 3017F COLORECTAL CA SCREEN DOC REV: CPT | Performed by: PHYSICAL MEDICINE & REHABILITATION

## 2022-07-28 PROCEDURE — G8432 DEP SCR NOT DOC, RNG: HCPCS | Performed by: PHYSICAL MEDICINE & REHABILITATION

## 2022-07-28 PROCEDURE — 1101F PT FALLS ASSESS-DOCD LE1/YR: CPT | Performed by: PHYSICAL MEDICINE & REHABILITATION

## 2022-07-28 PROCEDURE — G9899 SCRN MAM PERF RSLTS DOC: HCPCS | Performed by: PHYSICAL MEDICINE & REHABILITATION

## 2022-07-28 PROCEDURE — G8427 DOCREV CUR MEDS BY ELIG CLIN: HCPCS | Performed by: PHYSICAL MEDICINE & REHABILITATION

## 2022-07-28 PROCEDURE — 1090F PRES/ABSN URINE INCON ASSESS: CPT | Performed by: PHYSICAL MEDICINE & REHABILITATION

## 2022-07-28 PROCEDURE — G8536 NO DOC ELDER MAL SCRN: HCPCS | Performed by: PHYSICAL MEDICINE & REHABILITATION

## 2022-07-28 PROCEDURE — 1123F ACP DISCUSS/DSCN MKR DOCD: CPT | Performed by: PHYSICAL MEDICINE & REHABILITATION

## 2022-07-28 PROCEDURE — G8417 CALC BMI ABV UP PARAM F/U: HCPCS | Performed by: PHYSICAL MEDICINE & REHABILITATION

## 2022-07-28 PROCEDURE — G8399 PT W/DXA RESULTS DOCUMENT: HCPCS | Performed by: PHYSICAL MEDICINE & REHABILITATION

## 2022-07-28 RX ORDER — PREGABALIN 200 MG/1
200 CAPSULE ORAL 3 TIMES DAILY
Qty: 90 CAPSULE | Refills: 1 | Status: SHIPPED | OUTPATIENT
Start: 2022-07-28 | End: 2022-09-20 | Stop reason: SDUPTHER

## 2022-07-28 RX ORDER — PREGABALIN 300 MG/1
300 CAPSULE ORAL 2 TIMES DAILY
Qty: 60 CAPSULE | Refills: 1 | Status: CANCELLED | OUTPATIENT
Start: 2022-07-28

## 2022-07-28 RX ORDER — METOPROLOL TARTRATE 25 MG/1
25 TABLET, FILM COATED ORAL 2 TIMES DAILY
COMMUNITY
Start: 2022-07-16

## 2022-07-28 RX ORDER — ASPIRIN 81 MG/1
81 TABLET ORAL DAILY
COMMUNITY
Start: 2022-07-16

## 2022-07-28 NOTE — LETTER
7/28/2022    Patient: David Mcelroy   YOB: 1955   Date of Visit: 7/28/2022     Gucci Ospina MD  [address] [address]  Via Fax: [phone]    Dear Gucci Ospina MD,      Thank you for referring Ms. David Mcelroy to [address] [place] [address] [facility] for evaluation. My notes for this consultation are attached. If you have questions, please do not hesitate to call me. I look forward to following your patient along with you.       Sincerely,    Rosa Alcocer MD

## 2022-07-29 DIAGNOSIS — M54.16 LUMBAR NEURITIS: ICD-10-CM

## 2022-07-29 DIAGNOSIS — M47.819 FACET ARTHROPATHY: ICD-10-CM

## 2022-07-29 DIAGNOSIS — M54.16 LUMBAR RADICULOPATHY: ICD-10-CM

## 2022-07-29 DIAGNOSIS — M54.50 LUMBAR PAIN: ICD-10-CM

## 2022-07-29 DIAGNOSIS — G62.9 PERIPHERAL POLYNEUROPATHY: ICD-10-CM

## 2022-09-20 DIAGNOSIS — M47.819 FACET ARTHROPATHY: ICD-10-CM

## 2022-09-20 DIAGNOSIS — M54.50 LUMBAR PAIN: ICD-10-CM

## 2022-09-20 DIAGNOSIS — M54.16 LUMBAR RADICULOPATHY: ICD-10-CM

## 2022-09-20 DIAGNOSIS — M54.16 LUMBAR NEURITIS: ICD-10-CM

## 2022-09-20 DIAGNOSIS — G62.9 PERIPHERAL POLYNEUROPATHY: ICD-10-CM

## 2022-09-20 RX ORDER — PREGABALIN 200 MG/1
200 CAPSULE ORAL 3 TIMES DAILY
Qty: 90 CAPSULE | Refills: 1 | Status: SHIPPED | OUTPATIENT
Start: 2022-09-20 | End: 2022-10-27 | Stop reason: SDUPTHER

## 2022-09-20 NOTE — TELEPHONE ENCOUNTER
Patient has a few days left. Her MRI still not been scheduled. It states that Angelo Segal was unable to reach her so I gave her the number to scheduling. She is going to call them and then call us back to make a follow up.

## 2022-10-17 DIAGNOSIS — Z01.818 PREOPERATIVE TESTING: Primary | ICD-10-CM

## 2022-10-17 DIAGNOSIS — M17.11 PRIMARY OSTEOARTHRITIS OF RIGHT KNEE: ICD-10-CM

## 2022-10-26 NOTE — PROGRESS NOTES
[facility]  [address], [place]   Phone: [phone]  Fax: [phone]        PROGRESS NOTE      HISTORY OF PRESENT ILLNESS:  The patient is a 79 y.o. female and was seen today for follow up of pain is RLE>LLE and hurts on all sides of her legs Patient previously was seen for BLE pain from her mid thighs distally, worst distally. R>L. Previously, she was seen for BLE pain from her thighs in a L4 distribution to her toes. Her pain is not exacerbated positionally. Pt reports limitations with walking. Pt admits to chronic hx of BUE and BLE paresthesias. Pt reports she initially complained of LLE pain x 9/2021 without injury. RLE sxs started a week after her visit with Dr. Arminda Lobo on 1/27/2022. Denies much in the way of back pain. Pt had MRI done 8/11/2021 prior to reported pain complaint duration, pt states she had been sent to the ER on 8/11/2021 by her PCP secondary to left hip pain. Pt endorses she was unable to tolerate NEURONTIN 800 mg TID, was weaned off Neurontin and started on Lyrica. Pt admits she underwent previous spinal surgery on 1/8/1998 through Dr. Mp Gambino MD with good relief. Pt states she had been pain free up until last year. Pt states that she has been diagnosed with arthritis in the right knee by Dr. Arminda Lobo and is scheduled for knee replacement on 11/24/2022. Patient denies previous spinal injections, or physical therapy/chiropractic care. Pt denies change in bowel or bladder habits. Pt denies fever, weight loss, or skin changes. Pt denies h/o stomach ulcers or bleeding disorders. The patient is RHD. PmHx of epilepsy, HTN, DM, Asthma, spinal surgery (L4-L5-S1 posterior fusion). Pt admits her blood sugars occasionally run above 200. Denies being followed for her DM. Note from Dr. Arminda Lobo dated 1/27/2022 indicating patient was seen with c/o knee and back pain with radicular pain into the LLE to the toes. Worse with standing.  Improved with sitting. End stage osteoarthritis of bilateral knees. She endorsed left hip pain that he offered to injection, pt declined. Referred to hip PT. Referred to spine. Lumbar spine MRI dated 8/11/2021 films independently reviewed. Per report, stenosis/impingement: Central stenosis:  None significant. Lateral recess stenosis:  Low-grade left L1-L2. Foraminal stenosis:  None significant. Structure: L4-L5-S1 posterior fusion and decompression with solid osseous union. Advanced L2-L3 degenerative facet arthropathy with low-grade L2 anterolisthesis. Moderate disc degeneration T12-L3. A BLE EMG dated 4/6/2022 by Dr. Sams Grade was suggestive of  1. Severe chronic sensorimotor peripheral polyneuropathy - this is based on abnormalities throughout the NCS bilaterally. There are scattered signs of chronicity and mild muscle membrane instability. Note from Dr. Anita Gutierrez dated 3/10/2022 indicating patient was seen for reevaluation of low back w/ radicular pain. Dx with end stage osteoarthritis of R knee. At her last clinic appointment, I increased her Lyrica 225 mg BID to 200 mg TID. I advised to call office if intolerant to higher dose. I ordered a new L spine MRI to evaluate for any changes. I advised patient to bring copies of films to next visit. The patient returns today with pain is bilateral leg pain RLE>LLE which hurts on all sides of her legs, low back pain, and hip pain. She rates her pain 8/10, previously 10/10. Patient reports she is  tolerating her Lyrica increase from 225 mg BID to 200 TID, and her symptoms have  improved. Pt denies change in bowel or bladder habits. Patient reports she saw a vascular specialist and has a laser surgery for this next week, 11/3/2022. Lumbar spine MRI dated 10/06/2022 film was independently reviewed by me. Per report, No high-grade central stenosis. Stable postoperative changes from prior L4-S1 posterior hardware fusion, without central stenosis.  Solid-appearing marrow signal across L4-5 disc space, perhaps at L5-S1 as well. Stable alignment with L2-3 anterior listhesis, L4-5 retrolisthesis, and L5-S1 anterior listhesis. Suggest follow-up flexion and extension views if not already performed elsewhere. Additional mild-to-moderate degenerative changes and ancillary findings, as described in detail in Findings section. XR L spine dated 10/27/2022 4 views: Ap, lateral flexion and extension. Posterior decompression and fusion l4 - s1   Hardware intact. Grade 1 anterior enthesis on L5-S1 and grade 1 retrograde enthesis L2-L3.  mild disc space narrowing L1-2, L2-3 no instability on flexion or extension films.  reviewed. Body mass index is 33.51 kg/m². PCP: Sofie Palm MD      Past Medical History:   Diagnosis Date    Arthritis     Asthma     Diabetes ([facility] [address]     Epilepsy ([facility] [address]     Epilepsy ([facility] [address]     Hypertension     Seizures ([facility] [address]         Social History     Socioeconomic History    Marital status:      Spouse name: Not on file    Number of children: Not on file    Years of education: Not on file    Highest education level: Not on file   Occupational History    Not on file   Tobacco Use    Smoking status: Former    Smokeless tobacco: Never   Vaping Use    Vaping Use: Never used   Substance and Sexual Activity    Alcohol use: Never    Drug use: Never    Sexual activity: Not on file   Other Topics Concern    Not on file   Social History Narrative    Not on file     Social Determinants of Health     Financial Resource Strain: Not on file   Food Insecurity: Not on file   Transportation Needs: Not on file   Physical Activity: Not on file   Stress: Not on file   Social Connections: Not on file   Intimate Partner Violence: Not on file   Housing Stability: Not on file       Current Outpatient Medications   Medication Sig Dispense Refill    pregabalin (LYRICA) 200 mg capsule Take 1 Capsule by mouth three (3) times daily.  Max Daily Amount: 600 mg. 90 Capsule 1    metoprolol tartrate (LOPRESSOR) 25 mg tablet Take 25 mg by mouth two (2) times a day. aspirin delayed-release 81 mg tablet Take 81 mg by mouth in the morning. cholecalciferol (VITAMIN D3) (50,000 UNITS /1250 MCG) capsule TAKE 1 CAPSULE BY MOUTH ONCE A WEEK      traZODone (DESYREL) 50 mg tablet TAKE 1 TABLET BY MOUTH EVERY DAY AT BEDTIME      ergocalciferol (ERGOCALCIFEROL) 1,250 mcg (50,000 unit) capsule Take 50,000 Units by mouth every seven (7) days. insulin lispro (HUMALOG) 100 unit/mL kwikpen 12 Units by SubCUTAneous route.      liraglutide (VICTOZA) 0.6 mg/0.1 mL (18 mg/3 mL) pnij 1.2 mg by SubCUTAneous route. metFORMIN (GLUCOPHAGE) 1,000 mg tablet Take 1,000 mg by mouth two (2) times a day. lisinopriL (PRINIVIL, ZESTRIL) 20 mg tablet Take 20 mg by mouth daily. aspirin 81 mg chewable tablet Take 81 mg by mouth daily. atorvastatin (LIPITOR) 20 mg tablet Take 20 mg by mouth daily. insulin glargine,hum.rec.anlog (LANTUS SC) 60 Units by SubCUTAneous route nightly. insulin aspart protamine/insulin aspart (NOVOLOG MIX 70/30) 100 unit/mL (70-30) inpn by SubCUTAneous route three (3) times daily. gabapentin (Neurontin) 800 mg tablet Take 1 Tablet by mouth three (3) times daily. Max Daily Amount: 2,400 mg. 90 Tablet 1    diclofenac (VOLTAREN) 1 % gel Apply  to affected area four (4) times daily. 100 g 4       No Known Allergies       PHYSICAL EXAMINATION    Visit Vitals  Pulse 65   Temp 97.1 °F (36.2 °C) (Temporal)   Resp 18   Ht 5' 2\" (1.575 m)   Wt 183 lb 3.2 oz (83.1 kg)   SpO2 98%   BMI 33.51 kg/m²       CONSTITUTIONAL: NAD, A&O x 3  SENSATION: Intact to light touch throughout. Decreased circumferentially Bilaterally distally. NEURO: Jarad's is negative bilaterally. RANGE OF MOTION: The patient has full passive range of motion in all four extremities.   MOTOR:  Straight Leg Raise: Negative, bilateral    Ambulates with a rolling walker with seat     Shoulder AB/Flex Elbow Flex Wrist Ext Elbow Ext Wrist Flex Hand Intrin Tone   Right +4/5 +4/5 +4/5 +4/5 +4/5 +4/5 +4/5   Left +4/5 +4/5 +4/5 +4/5 +4/5 +4/5 +4/5              Hip Flex Knee Ext Knee Flex Ankle DF GTE Ankle PF Tone   Right +4/5 +4/5 +4/5 +4/5 +4/5 +4/5 +4/5   Left +4/5 +4/5 +4/5 +4/5 +4/5 +4/5 +4/5       ASSESSMENT   Diagnoses and all orders for this visit:    1. Spondylolisthesis, lumbar region  -     AMB POC XRAY, SPINE, LUMBOSACRAL; 4+    2. Lumbar radiculopathy  -     AMB POC XRAY, SPINE, LUMBOSACRAL; 4+    3. Facet arthropathy  -     AMB POC XRAY, SPINE, LUMBOSACRAL; 4+    4. Lumbar pain  -     AMB POC XRAY, SPINE, LUMBOSACRAL; 4+    5. Peripheral polyneuropathy  -     AMB POC XRAY, SPINE, LUMBOSACRAL; 4+    6. Lumbar neuritis        IMPRESSION AND PLAN:  Patient returns to the office today with c/o pain is RLE>LLE and hurts on all sides of her legs. Multiple treatment options were discussed. Patient is neurologically intact. I will ordered a Lumbar XR in office. Patient had symptoms consistent with Neuropathy I did not appreciate significant neural impingement on lumbar spine MRI. Discussed spinal cord stimulation trial, at this time she is not interested. I will refill her Lyrica 200 mg TID. will see the patient back in 3 month's time or earlier if needed. Written by Jeff Sheriff, as dictated by Kenneth Healy MD  I examined the patient, reviewed and agree with the note.

## 2022-10-27 ENCOUNTER — OFFICE VISIT (OUTPATIENT)
Dept: ORTHOPEDIC SURGERY | Age: 67
End: 2022-10-27
Payer: MEDICARE

## 2022-10-27 VITALS
BODY MASS INDEX: 33.71 KG/M2 | HEART RATE: 65 BPM | WEIGHT: 183.2 LBS | HEIGHT: 62 IN | RESPIRATION RATE: 18 BRPM | TEMPERATURE: 97.1 F | OXYGEN SATURATION: 98 %

## 2022-10-27 DIAGNOSIS — M54.16 LUMBAR RADICULOPATHY: ICD-10-CM

## 2022-10-27 DIAGNOSIS — M47.819 FACET ARTHROPATHY: ICD-10-CM

## 2022-10-27 DIAGNOSIS — M54.16 LUMBAR NEURITIS: ICD-10-CM

## 2022-10-27 DIAGNOSIS — M54.50 LUMBAR PAIN: ICD-10-CM

## 2022-10-27 DIAGNOSIS — G62.9 PERIPHERAL POLYNEUROPATHY: ICD-10-CM

## 2022-10-27 DIAGNOSIS — M43.16 SPONDYLOLISTHESIS, LUMBAR REGION: Primary | ICD-10-CM

## 2022-10-27 PROCEDURE — G8399 PT W/DXA RESULTS DOCUMENT: HCPCS | Performed by: PHYSICAL MEDICINE & REHABILITATION

## 2022-10-27 PROCEDURE — 1090F PRES/ABSN URINE INCON ASSESS: CPT | Performed by: PHYSICAL MEDICINE & REHABILITATION

## 2022-10-27 PROCEDURE — G9899 SCRN MAM PERF RSLTS DOC: HCPCS | Performed by: PHYSICAL MEDICINE & REHABILITATION

## 2022-10-27 PROCEDURE — G8432 DEP SCR NOT DOC, RNG: HCPCS | Performed by: PHYSICAL MEDICINE & REHABILITATION

## 2022-10-27 PROCEDURE — G8417 CALC BMI ABV UP PARAM F/U: HCPCS | Performed by: PHYSICAL MEDICINE & REHABILITATION

## 2022-10-27 PROCEDURE — 1123F ACP DISCUSS/DSCN MKR DOCD: CPT | Performed by: PHYSICAL MEDICINE & REHABILITATION

## 2022-10-27 PROCEDURE — 1101F PT FALLS ASSESS-DOCD LE1/YR: CPT | Performed by: PHYSICAL MEDICINE & REHABILITATION

## 2022-10-27 PROCEDURE — 72110 X-RAY EXAM L-2 SPINE 4/>VWS: CPT | Performed by: PHYSICAL MEDICINE & REHABILITATION

## 2022-10-27 PROCEDURE — 99214 OFFICE O/P EST MOD 30 MIN: CPT | Performed by: PHYSICAL MEDICINE & REHABILITATION

## 2022-10-27 PROCEDURE — 3017F COLORECTAL CA SCREEN DOC REV: CPT | Performed by: PHYSICAL MEDICINE & REHABILITATION

## 2022-10-27 PROCEDURE — G8427 DOCREV CUR MEDS BY ELIG CLIN: HCPCS | Performed by: PHYSICAL MEDICINE & REHABILITATION

## 2022-10-27 PROCEDURE — G8536 NO DOC ELDER MAL SCRN: HCPCS | Performed by: PHYSICAL MEDICINE & REHABILITATION

## 2022-10-27 RX ORDER — PREGABALIN 200 MG/1
200 CAPSULE ORAL 3 TIMES DAILY
Qty: 270 CAPSULE | Refills: 0 | Status: SHIPPED | OUTPATIENT
Start: 2022-10-27

## 2023-01-15 DIAGNOSIS — Z01.818 PREOPERATIVE TESTING: ICD-10-CM

## 2023-01-15 DIAGNOSIS — M17.11 PRIMARY OSTEOARTHRITIS OF RIGHT KNEE: ICD-10-CM

## 2023-01-26 ENCOUNTER — OFFICE VISIT (OUTPATIENT)
Dept: ORTHOPEDIC SURGERY | Age: 68
End: 2023-01-26
Payer: MEDICARE

## 2023-01-26 VITALS
RESPIRATION RATE: 16 BRPM | TEMPERATURE: 96.6 F | WEIGHT: 182 LBS | HEART RATE: 58 BPM | HEIGHT: 62 IN | BODY MASS INDEX: 33.49 KG/M2 | OXYGEN SATURATION: 96 %

## 2023-01-26 DIAGNOSIS — M54.14 THORACIC NEURITIS: ICD-10-CM

## 2023-01-26 DIAGNOSIS — G62.9 PERIPHERAL POLYNEUROPATHY: ICD-10-CM

## 2023-01-26 DIAGNOSIS — M54.50 LUMBAR PAIN: ICD-10-CM

## 2023-01-26 DIAGNOSIS — M43.16 SPONDYLOLISTHESIS, LUMBAR REGION: ICD-10-CM

## 2023-01-26 DIAGNOSIS — M54.16 LUMBAR NEURITIS: Primary | ICD-10-CM

## 2023-01-26 DIAGNOSIS — M47.814 THORACIC SPONDYLOSIS WITHOUT MYELOPATHY: ICD-10-CM

## 2023-01-26 DIAGNOSIS — M54.16 LUMBAR RADICULOPATHY: ICD-10-CM

## 2023-01-26 DIAGNOSIS — M54.6 THORACIC BACK PAIN, UNSPECIFIED BACK PAIN LATERALITY, UNSPECIFIED CHRONICITY: ICD-10-CM

## 2023-01-26 DIAGNOSIS — M47.819 FACET ARTHROPATHY: ICD-10-CM

## 2023-01-26 PROCEDURE — 1090F PRES/ABSN URINE INCON ASSESS: CPT | Performed by: PHYSICAL MEDICINE & REHABILITATION

## 2023-01-26 PROCEDURE — 99214 OFFICE O/P EST MOD 30 MIN: CPT | Performed by: PHYSICAL MEDICINE & REHABILITATION

## 2023-01-26 PROCEDURE — 1101F PT FALLS ASSESS-DOCD LE1/YR: CPT | Performed by: PHYSICAL MEDICINE & REHABILITATION

## 2023-01-26 PROCEDURE — G8399 PT W/DXA RESULTS DOCUMENT: HCPCS | Performed by: PHYSICAL MEDICINE & REHABILITATION

## 2023-01-26 PROCEDURE — 1123F ACP DISCUSS/DSCN MKR DOCD: CPT | Performed by: PHYSICAL MEDICINE & REHABILITATION

## 2023-01-26 PROCEDURE — G8427 DOCREV CUR MEDS BY ELIG CLIN: HCPCS | Performed by: PHYSICAL MEDICINE & REHABILITATION

## 2023-01-26 PROCEDURE — 3017F COLORECTAL CA SCREEN DOC REV: CPT | Performed by: PHYSICAL MEDICINE & REHABILITATION

## 2023-01-26 PROCEDURE — G8432 DEP SCR NOT DOC, RNG: HCPCS | Performed by: PHYSICAL MEDICINE & REHABILITATION

## 2023-01-26 PROCEDURE — G8417 CALC BMI ABV UP PARAM F/U: HCPCS | Performed by: PHYSICAL MEDICINE & REHABILITATION

## 2023-01-26 PROCEDURE — G8536 NO DOC ELDER MAL SCRN: HCPCS | Performed by: PHYSICAL MEDICINE & REHABILITATION

## 2023-01-26 RX ORDER — LIDOCAINE 50 MG/G
1 PATCH TOPICAL EVERY 24 HOURS
Qty: 90 EACH | Refills: 0 | Status: SHIPPED | OUTPATIENT
Start: 2023-01-26

## 2023-01-26 RX ORDER — PREGABALIN 200 MG/1
200 CAPSULE ORAL 3 TIMES DAILY
Qty: 270 CAPSULE | Refills: 0 | Status: SHIPPED | OUTPATIENT
Start: 2023-01-26

## 2023-01-26 NOTE — PROGRESS NOTES
[facility]  [address]  [facility], [facility] [place]   Phone: [phone]  Fax: [phone]        PROGRESS NOTE      HISTORY OF PRESENT ILLNESS:  The patient is a 79 y.o. female and was seen today for follow up of low back pain, BLE (R>L) radiating distally from the mid thigh to the feet, and bilateral hip pain. Patient previously was seen for BLE pain from her mid thighs distally, worst distally. R>L. Previously, she was seen for BLE pain from her thighs in a L4 distribution to her toes. Her pain is not exacerbated positionally. Pt reports limitations with walking. Pt admits to chronic hx of BUE and BLE paresthesias. Pt reports she initially complained of LLE pain x 9/2021 without injury. RLE sxs started a week after her visit with Dr. Lv Hawk on 1/27/2022. Denies much in the way of back pain. Pt had MRI done 8/11/2021 prior to reported pain complaint duration, pt states she had been sent to the ER on 8/11/2021 by her PCP secondary to left hip pain. Pt endorses she was unable to tolerate NEURONTIN 800 mg TID, was weaned off Neurontin and started on Lyrica. Pt admits she underwent previous spinal surgery on 1/8/1998 through Dr. Haim Smith MD with good relief. Pt states she had been pain free up until last year. Pt states that she has been diagnosed with arthritis in the right knee by Dr. Lv Hawk and is scheduled for knee replacement on 11/24/2022. Patient denies previous spinal injections, or physical therapy/chiropractic care. Pt denies change in bowel or bladder habits. Pt denies fever, weight loss, or skin changes. Pt denies h/o stomach ulcers or bleeding disorders. The patient is RHD. PmHx of epilepsy, HTN, DM, Asthma, spinal surgery (L4-L5-S1 posterior fusion). Pt admits her blood sugars occasionally run above 200. Denies being followed for her DM.  Note from Dr. Lv Hawk dated 1/27/2022 indicating patient was seen with c/o knee and back pain with radicular pain into the LLE to the toes. Worse with standing. Improved with sitting. End stage osteoarthritis of bilateral knees. She endorsed left hip pain that he offered to injection, pt declined. Referred to hip PT. Referred to spine. A BLE EMG dated 4/6/2022 by Dr. Sera Carolina was suggestive of  1. Severe chronic sensorimotor peripheral polyneuropathy - this is based on abnormalities throughout the NCS bilaterally. There are scattered signs of chronicity and mild muscle membrane instability. Note from Dr. Chirag Page dated 3/10/2022 indicating patient was seen for reevaluation of low back w/ radicular pain. Dx with end stage osteoarthritis of R knee. Lumbar spine MRI dated 10/06/2022 film was independently reviewed by me. Per report, No high-grade central stenosis. Stable postoperative changes from prior L4-S1 posterior hardware fusion, without central stenosis. Solid-appearing marrow signal across L4-5 disc space, perhaps at L5-S1 as well. Stable alignment with L2-3 anterior listhesis, L4-5 retrolisthesis, and L5-S1 anterior listhesis. Suggest follow-up flexion and extension views if not already performed elsewhere. Additional mild-to-moderate degenerative changes and ancillary findings, as described in detail in Findings section. XR L spine dated 10/27/2022 4 views: Ap, lateral, flexion, and extension. Posterior decompression and fusion L4-S1. Hardware intact. Grade 1 anterolisthesis on L5 on S1 and grade 1 retrolisthesis L2-L3. Mild disc space narrowing L1-2 and L2-3. No instability noted on flexion or extension films. At her last clinic appointment, I discussed a SCS trial, pt was not interested. I refilled her Lyrica 200 mg TID. The patient returns today with pain location and distribution remains unchanged. She rates her BLE pain 5/10, previously 8/10. Patient is still taking the Lyrica 200 mg TID. The patient has a history of DM and reports blood sugars are well controlled, consistently remaining below 200, PCP.  Patient had blood glucose levels of 394 at ER on 12/10/2022. Patient denies Glaucoma. Patient denies blood thinners or antidepressants. Pt denies change in bowel or bladder habits. Patient says her right knee replacement is on hold because she has no one to help her. At the end of the office visit patient says she has pain in the posterior lateral rib area radiating to the right lateral rib and to the breast. that is a 9-10/10. Patient also says she has swelling. Patient says she has a bump on her right scapula. Patient has seen Dr. Abraham Bliss. Patient reports she had a CT scan that showed nothing. Patient says she uses lidocaine patches with some relief. Her pain is exacerbated by lying down to sitting up and sitting upright for long periods of time.  reviewed. Body mass index is 33.29 kg/m².     PCP: Yoni Desouza MD      Past Medical History:   Diagnosis Date    Arthritis     Asthma     Diabetes ([facility] [address]     Epilepsy ([facility] [address]     Epilepsy ([facility] [address]     Hypertension     Seizures ([facility] [address]         Social History     Socioeconomic History    Marital status:      Spouse name: Not on file    Number of children: Not on file    Years of education: Not on file    Highest education level: Not on file   Occupational History    Not on file   Tobacco Use    Smoking status: Former    Smokeless tobacco: Never   Vaping Use    Vaping Use: Never used   Substance and Sexual Activity    Alcohol use: Never    Drug use: Never    Sexual activity: Not on file   Other Topics Concern    Not on file   Social History Narrative    Not on file     Social Determinants of Health     Financial Resource Strain: Not on file   Food Insecurity: Not on file   Transportation Needs: Not on file   Physical Activity: Not on file   Stress: Not on file   Social Connections: Not on file   Intimate Partner Violence: Not on file   Housing Stability: Not on file       Current Outpatient Medications   Medication Sig Dispense Refill    pregabalin (Tootie Jolly) 200 mg capsule Take 1 Capsule by mouth three (3) times daily. Max Daily Amount: 600 mg. 270 Capsule 0    metoprolol tartrate (LOPRESSOR) 25 mg tablet Take 25 mg by mouth two (2) times a day. aspirin delayed-release 81 mg tablet Take 81 mg by mouth in the morning. cholecalciferol (VITAMIN D3) (50,000 UNITS /1250 MCG) capsule TAKE 1 CAPSULE BY MOUTH ONCE A WEEK      traZODone (DESYREL) 50 mg tablet TAKE 1 TABLET BY MOUTH EVERY DAY AT BEDTIME      ergocalciferol (ERGOCALCIFEROL) 1,250 mcg (50,000 unit) capsule Take 50,000 Units by mouth every seven (7) days. insulin lispro (HUMALOG) 100 unit/mL kwikpen 12 Units by SubCUTAneous route.      liraglutide (VICTOZA) 0.6 mg/0.1 mL (18 mg/3 mL) pnij 1.2 mg by SubCUTAneous route. metFORMIN (GLUCOPHAGE) 1,000 mg tablet Take 1,000 mg by mouth two (2) times a day. lisinopriL (PRINIVIL, ZESTRIL) 20 mg tablet Take 20 mg by mouth daily. aspirin 81 mg chewable tablet Take 81 mg by mouth daily. atorvastatin (LIPITOR) 20 mg tablet Take 20 mg by mouth daily. insulin glargine,hum.rec.anlog (LANTUS SC) 60 Units by SubCUTAneous route nightly. insulin aspart protamine/insulin aspart (NOVOLOG MIX 70/30) 100 unit/mL (70-30) inpn by SubCUTAneous route three (3) times daily. gabapentin (Neurontin) 800 mg tablet Take 1 Tablet by mouth three (3) times daily. Max Daily Amount: 2,400 mg. 90 Tablet 1    diclofenac (VOLTAREN) 1 % gel Apply  to affected area four (4) times daily.  100 g 4       No Known Allergies       PHYSICAL EXAMINATION    Visit Vitals  Pulse (!) 58   Temp (!) 96.6 °F (35.9 °C) (Temporal)   Resp 16   Ht 5' 2\" (1.575 m)   Wt 182 lb (82.6 kg)   SpO2 96%   BMI 33.29 kg/m²       CONSTITUTIONAL: NAD, A&O x 3  SENSATION: Intact to light touch throughout  MOTOR:  Straight Leg Raise: Negative, bilateral  Tender to palpation in the Posterior lateral rib area radiating to the right lateral rib and to the breast.   No lump detected on palpation. Ambulates with a rolling walker with seat. Hip Flex Knee Ext Knee Flex Ankle DF GTE Ankle PF Tone   Right +4/5 +4/5 +4/5 +4/5 +4/5 +4/5 +4/5   Left +4/5 +4/5 +4/5 +4/5 +4/5 +4/5 +4/5     RADIOGRAPHS  Thoracic spine plain films dated 1/26/2023. 2 views: AP and lateral. Revealed:  Diffuse spondylosis of thoracic spine. No acute pathology identified. ASSESSMENT   Diagnoses and all orders for this visit:    1. Lumbar neuritis    2. Peripheral polyneuropathy    3. Facet arthropathy    4. Spondylolisthesis, lumbar region    5. Lumbar radiculopathy    6. Lumbar pain    7. Thoracic back pain, unspecified back pain laterality, unspecified chronicity    8. Thoracic neuritis    9. Thoracic spondylosis without myelopathy        IMPRESSION AND PLAN:  Patient returns to the office today with c/o low back pain, BLE (R>L) radiating distally from the mid thigh to the feet, and bilateral hip pain. Multiple treatment options were discussed. Patient is not interested in the SCS trial. I offered changing her Lyrica 200 mg TID to another neuropathic medication, pt declined. I will refill her Lyrica 200 mg TID. Patient describes pain in the posterior lateral rib area radiating to the right lateral rib and to the breast. Patient went to the ER on 12/10/2022 for her thoracic spine pain, but was evaluated for abdominal pain in the ER, which patient denies every having. It appears that the patient had an abdominal CT done in the ER and not a Thoracic spine CT. I will order a Thoracic spine MRI. I advised patient to bring copies of films to next visit. Patient is neurologically intact. I will see the patient back after T spine MRI or earlier if needed. Written by Ángela Wells, as dictated by Crispin Srinivasan MD  I examined the patient, reviewed and agree with the note.

## 2023-01-26 NOTE — LETTER
1/26/2023    Patient: Sven Sever   YOB: 1955   Date of Visit: 1/26/2023     Anneliese Alcnatara MD  [address] [address]  Via Fax: [phone]    Dear Anneliese Alcantara MD,      Thank you for referring Ms. Sven Sever to [facility] for evaluation. My notes for this consultation are attached. If you have questions, please do not hesitate to call me. I look forward to following your patient along with you.       Sincerely,    Michael May MD

## 2023-02-13 ENCOUNTER — TELEPHONE (OUTPATIENT)
Age: 68
End: 2023-02-13

## 2023-02-13 NOTE — TELEPHONE ENCOUNTER
Patient saw Dr. Fran Lu on Jan 26. and she stated that he ordered an MRI. She said she has not heard anything about an appt. For an MRI.      She requests a call back at [phone]

## 2023-02-13 NOTE — TELEPHONE ENCOUNTER
I spoke with Ms. Lucrecia Mckee to inform her the MRI request will be faxed after 5:00PM today. She expressed her understanding.

## 2023-03-30 ENCOUNTER — OFFICE VISIT (OUTPATIENT)
Age: 68
End: 2023-03-30
Payer: MEDICARE

## 2023-03-30 VITALS
TEMPERATURE: 97.4 F | WEIGHT: 196.8 LBS | OXYGEN SATURATION: 97 % | BODY MASS INDEX: 36.22 KG/M2 | HEIGHT: 62 IN | HEART RATE: 74 BPM | RESPIRATION RATE: 20 BRPM

## 2023-03-30 DIAGNOSIS — M43.16 SPONDYLOLISTHESIS, LUMBAR REGION: ICD-10-CM

## 2023-03-30 DIAGNOSIS — M47.819 FACET ARTHROPATHY: ICD-10-CM

## 2023-03-30 DIAGNOSIS — G62.9 POLYNEUROPATHY, UNSPECIFIED: ICD-10-CM

## 2023-03-30 DIAGNOSIS — M47.814 THORACIC SPONDYLOSIS WITHOUT MYELOPATHY: ICD-10-CM

## 2023-03-30 DIAGNOSIS — M54.16 LUMBAR NEURITIS: Primary | ICD-10-CM

## 2023-03-30 DIAGNOSIS — M54.6 THORACIC BACK PAIN, UNSPECIFIED BACK PAIN LATERALITY, UNSPECIFIED CHRONICITY: ICD-10-CM

## 2023-03-30 PROCEDURE — 1123F ACP DISCUSS/DSCN MKR DOCD: CPT | Performed by: PHYSICAL MEDICINE & REHABILITATION

## 2023-03-30 PROCEDURE — 99214 OFFICE O/P EST MOD 30 MIN: CPT | Performed by: PHYSICAL MEDICINE & REHABILITATION

## 2023-03-30 RX ORDER — INSULIN GLARGINE 100 [IU]/ML
INJECTION, SOLUTION SUBCUTANEOUS
COMMUNITY
Start: 2023-01-29

## 2023-03-30 RX ORDER — PREGABALIN 200 MG/1
200 CAPSULE ORAL 3 TIMES DAILY
Qty: 270 CAPSULE | Refills: 1 | Status: SHIPPED | OUTPATIENT
Start: 2023-03-30 | End: 2023-09-26

## 2023-03-30 RX ORDER — IBUPROFEN 600 MG/1
600 TABLET ORAL EVERY 6 HOURS PRN
COMMUNITY
Start: 2022-10-08

## 2023-03-30 RX ORDER — LIDOCAINE 50 MG/G
PATCH TOPICAL
COMMUNITY
Start: 2023-01-26

## 2023-08-31 ENCOUNTER — TELEPHONE (OUTPATIENT)
Age: 68
End: 2023-08-31

## 2023-08-31 DIAGNOSIS — M47.819 SPONDYLOSIS WITHOUT MYELOPATHY OR RADICULOPATHY, SITE UNSPECIFIED: ICD-10-CM

## 2023-08-31 DIAGNOSIS — M47.819 FACET ARTHROPATHY: Primary | ICD-10-CM

## 2023-08-31 DIAGNOSIS — M47.814 THORACIC SPONDYLOSIS WITHOUT MYELOPATHY: ICD-10-CM

## 2023-08-31 DIAGNOSIS — M43.16 SPONDYLOLISTHESIS, LUMBAR REGION: ICD-10-CM

## 2023-08-31 RX ORDER — LIDOCAINE 50 MG/G
PATCH TOPICAL
Qty: 30 PATCH | Refills: 0 | Status: SHIPPED | OUTPATIENT
Start: 2023-08-31

## 2023-08-31 NOTE — TELEPHONE ENCOUNTER
Patient called and is asking for some Lidocaine 5 % patches from . [address] [place] [address] on [facility] in [place]. [phone]. Patient tel. [phone]. Note : patient last seen on 3/30/23. Next appt on 9/21/23 with Dairl Boom.

## 2023-09-21 ENCOUNTER — OFFICE VISIT (OUTPATIENT)
Age: 68
End: 2023-09-21
Payer: MEDICARE

## 2023-09-21 VITALS
BODY MASS INDEX: 37.49 KG/M2 | WEIGHT: 205 LBS | RESPIRATION RATE: 17 BRPM | TEMPERATURE: 98 F | HEART RATE: 61 BPM | OXYGEN SATURATION: 97 %

## 2023-09-21 DIAGNOSIS — M96.1 LUMBAR POST-LAMINECTOMY SYNDROME: Primary | ICD-10-CM

## 2023-09-21 DIAGNOSIS — G62.9 POLYNEUROPATHY, UNSPECIFIED: ICD-10-CM

## 2023-09-21 DIAGNOSIS — M54.16 LUMBAR NEURITIS: ICD-10-CM

## 2023-09-21 DIAGNOSIS — M47.819 FACET ARTHROPATHY: ICD-10-CM

## 2023-09-21 DIAGNOSIS — M43.16 SPONDYLOLISTHESIS, LUMBAR REGION: ICD-10-CM

## 2023-09-21 DIAGNOSIS — M54.6 THORACIC BACK PAIN, UNSPECIFIED BACK PAIN LATERALITY, UNSPECIFIED CHRONICITY: ICD-10-CM

## 2023-09-21 DIAGNOSIS — M47.814 THORACIC SPONDYLOSIS WITHOUT MYELOPATHY: ICD-10-CM

## 2023-09-21 PROCEDURE — G8417 CALC BMI ABV UP PARAM F/U: HCPCS | Performed by: PHYSICAL MEDICINE & REHABILITATION

## 2023-09-21 PROCEDURE — 1123F ACP DISCUSS/DSCN MKR DOCD: CPT | Performed by: PHYSICAL MEDICINE & REHABILITATION

## 2023-09-21 PROCEDURE — 1036F TOBACCO NON-USER: CPT | Performed by: PHYSICAL MEDICINE & REHABILITATION

## 2023-09-21 PROCEDURE — 99214 OFFICE O/P EST MOD 30 MIN: CPT | Performed by: PHYSICAL MEDICINE & REHABILITATION

## 2023-09-21 PROCEDURE — G8427 DOCREV CUR MEDS BY ELIG CLIN: HCPCS | Performed by: PHYSICAL MEDICINE & REHABILITATION

## 2023-09-21 PROCEDURE — 3017F COLORECTAL CA SCREEN DOC REV: CPT | Performed by: PHYSICAL MEDICINE & REHABILITATION

## 2023-09-21 PROCEDURE — 1090F PRES/ABSN URINE INCON ASSESS: CPT | Performed by: PHYSICAL MEDICINE & REHABILITATION

## 2023-09-21 PROCEDURE — G8399 PT W/DXA RESULTS DOCUMENT: HCPCS | Performed by: PHYSICAL MEDICINE & REHABILITATION

## 2023-09-21 RX ORDER — PREGABALIN 200 MG/1
200 CAPSULE ORAL 3 TIMES DAILY
Qty: 270 CAPSULE | Refills: 1 | Status: SHIPPED | OUTPATIENT
Start: 2023-09-21 | End: 2024-03-19

## 2023-09-21 NOTE — PROGRESS NOTES
[facility]  [address]  [address]  [place], [place]  Tel: [phone]  Fax: [phone]          PROGRESS NOTE      HISTORY OF PRESENT ILLNESS:  The patient is a 79 y.o. female and was seen today for follow up of low back pain, BLE (R>L) radiating distally from the mid thigh to the feet, and bilateral hip pain. Previously seen for low back pain, BLE (R>L) radiating distally from the mid thigh to the feet, and bilateral hip pain. Additionally, posterior lateral rib area radiating to the right lateral rib (located in the lower thoracic spine) and to the breast. that is a 9-10/10. Patient also says she has swelling. Patient says she has a bump on her right scapula. Patient  has seen Dr. Abdulaziz King. Patient previously was seen for  BLE pain from her mid thighs distally, worst distally. R>L. Previously, she was seen for BLE pain from her thighs in a L4 distribution to her toes. Her pain is not exacerbated positionally. Pt reports  limitations with walking. Pt admits to chronic hx of BUE and BLE paresthesias. Pt reports she initially complained of LLE pain x 9/2021 without injury. RLE sxs started a week after her visit with Dr. Tiffany Matos on 1/27/2022. Denies much in the way of back  pain. Pt had MRI done 8/11/2021 prior to reported pain complaint duration, pt states she had been sent to the ER on 8/11/2021 by her PCP secondary to left hip pain. Pt endorses she was unable to tolerate  NEURONTIN 800 mg TID, was weaned off Neurontin and started on Lyrica. Pt admits she underwent previous spinal surgery on 1/8/1998 through Dr. Alondra Penaloza MD  with good relief. Pt states she had been pain free up until last year. Patient denies previous spinal injections, or physical therapy/chiropractic care. Pt denies change in bowel or bladder habits. Pt  denies fever, weight loss, or skin changes. Pt denies h/o stomach ulcers or bleeding disorders. The patient is RHD.  PmHx of epilepsy,

## 2024-03-21 ENCOUNTER — OFFICE VISIT (OUTPATIENT)
Age: 69
End: 2024-03-21
Payer: MEDICARE

## 2024-03-21 VITALS
OXYGEN SATURATION: 99 % | WEIGHT: 206 LBS | RESPIRATION RATE: 18 BRPM | BODY MASS INDEX: 37.91 KG/M2 | HEIGHT: 62 IN | TEMPERATURE: 98.3 F | HEART RATE: 63 BPM

## 2024-03-21 DIAGNOSIS — M47.819 FACET ARTHROPATHY: ICD-10-CM

## 2024-03-21 DIAGNOSIS — M54.16 LUMBAR NEURITIS: Primary | ICD-10-CM

## 2024-03-21 DIAGNOSIS — M54.6 THORACIC BACK PAIN, UNSPECIFIED BACK PAIN LATERALITY, UNSPECIFIED CHRONICITY: ICD-10-CM

## 2024-03-21 DIAGNOSIS — G62.9 POLYNEUROPATHY, UNSPECIFIED: ICD-10-CM

## 2024-03-21 DIAGNOSIS — M43.16 SPONDYLOLISTHESIS, LUMBAR REGION: ICD-10-CM

## 2024-03-21 DIAGNOSIS — M47.814 THORACIC SPONDYLOSIS WITHOUT MYELOPATHY: ICD-10-CM

## 2024-03-21 PROCEDURE — 99214 OFFICE O/P EST MOD 30 MIN: CPT | Performed by: PHYSICAL MEDICINE & REHABILITATION

## 2024-03-21 PROCEDURE — G8427 DOCREV CUR MEDS BY ELIG CLIN: HCPCS | Performed by: PHYSICAL MEDICINE & REHABILITATION

## 2024-03-21 PROCEDURE — 1036F TOBACCO NON-USER: CPT | Performed by: PHYSICAL MEDICINE & REHABILITATION

## 2024-03-21 PROCEDURE — 1123F ACP DISCUSS/DSCN MKR DOCD: CPT | Performed by: PHYSICAL MEDICINE & REHABILITATION

## 2024-03-21 PROCEDURE — 1090F PRES/ABSN URINE INCON ASSESS: CPT | Performed by: PHYSICAL MEDICINE & REHABILITATION

## 2024-03-21 PROCEDURE — 3017F COLORECTAL CA SCREEN DOC REV: CPT | Performed by: PHYSICAL MEDICINE & REHABILITATION

## 2024-03-21 PROCEDURE — G8417 CALC BMI ABV UP PARAM F/U: HCPCS | Performed by: PHYSICAL MEDICINE & REHABILITATION

## 2024-03-21 PROCEDURE — G8484 FLU IMMUNIZE NO ADMIN: HCPCS | Performed by: PHYSICAL MEDICINE & REHABILITATION

## 2024-03-21 PROCEDURE — G8399 PT W/DXA RESULTS DOCUMENT: HCPCS | Performed by: PHYSICAL MEDICINE & REHABILITATION

## 2024-03-21 RX ORDER — PREGABALIN 200 MG/1
200 CAPSULE ORAL 3 TIMES DAILY
Qty: 270 CAPSULE | Refills: 1 | Status: SHIPPED | OUTPATIENT
Start: 2024-03-21 | End: 2024-09-17

## 2024-03-21 NOTE — PROGRESS NOTES
Daily Amount: 600 mg    Thoracic back pain, unspecified back pain laterality, unspecified chronicity  -     pregabalin (LYRICA) 200 MG capsule; Take 1 capsule by mouth in the morning, at noon, and at bedtime for 180 days. Max Daily Amount: 600 mg          IMPRESSION AND PLAN:  Patient returns to the office today with c/o low back pain, BLE (R>L) radiating distally from the mid thigh to the feet, and bilateral hip pain. . Multiple treatment options were discussed. She is not interested in surgery or injection at this time. I discussed increasing her neuropathic medication, pt declined. She wished to continue with the current treatment plan. I will refill her Lyrica 200 mg TID. She has had difficulties finding a nephrologist that accepts her insurance. I suggested she calls her insurance company.  The patient is Neurologically intact. I will see the patient back in 6 months or earlier if needed.     Written by Keanu Gaviria, as dictated by Cuong Caceres MD  I examined the patient, reviewed and agree with the note.

## 2024-09-12 ENCOUNTER — OFFICE VISIT (OUTPATIENT)
Age: 69
End: 2024-09-12
Payer: MEDICARE

## 2024-09-12 ENCOUNTER — TELEPHONE (OUTPATIENT)
Age: 69
End: 2024-09-12

## 2024-09-12 VITALS — HEIGHT: 62 IN | BODY MASS INDEX: 38.83 KG/M2 | WEIGHT: 211 LBS

## 2024-09-12 DIAGNOSIS — M43.16 SPONDYLOLISTHESIS, LUMBAR REGION: ICD-10-CM

## 2024-09-12 DIAGNOSIS — M54.6 THORACIC BACK PAIN, UNSPECIFIED BACK PAIN LATERALITY, UNSPECIFIED CHRONICITY: Primary | ICD-10-CM

## 2024-09-12 DIAGNOSIS — M47.819 FACET ARTHROPATHY: ICD-10-CM

## 2024-09-12 DIAGNOSIS — M54.16 LUMBAR NEURITIS: ICD-10-CM

## 2024-09-12 DIAGNOSIS — M47.814 THORACIC SPONDYLOSIS WITHOUT MYELOPATHY: ICD-10-CM

## 2024-09-12 DIAGNOSIS — G62.9 POLYNEUROPATHY, UNSPECIFIED: ICD-10-CM

## 2024-09-12 PROCEDURE — 1123F ACP DISCUSS/DSCN MKR DOCD: CPT | Performed by: PHYSICAL MEDICINE & REHABILITATION

## 2024-09-12 PROCEDURE — 72110 X-RAY EXAM L-2 SPINE 4/>VWS: CPT | Performed by: PHYSICAL MEDICINE & REHABILITATION

## 2024-09-12 PROCEDURE — 99204 OFFICE O/P NEW MOD 45 MIN: CPT | Performed by: PHYSICAL MEDICINE & REHABILITATION

## 2024-09-12 PROCEDURE — G8427 DOCREV CUR MEDS BY ELIG CLIN: HCPCS | Performed by: PHYSICAL MEDICINE & REHABILITATION

## 2024-09-12 PROCEDURE — G8417 CALC BMI ABV UP PARAM F/U: HCPCS | Performed by: PHYSICAL MEDICINE & REHABILITATION

## 2024-09-12 PROCEDURE — 1036F TOBACCO NON-USER: CPT | Performed by: PHYSICAL MEDICINE & REHABILITATION

## 2024-09-12 PROCEDURE — G8399 PT W/DXA RESULTS DOCUMENT: HCPCS | Performed by: PHYSICAL MEDICINE & REHABILITATION

## 2024-09-12 PROCEDURE — 3017F COLORECTAL CA SCREEN DOC REV: CPT | Performed by: PHYSICAL MEDICINE & REHABILITATION

## 2024-09-12 PROCEDURE — 1090F PRES/ABSN URINE INCON ASSESS: CPT | Performed by: PHYSICAL MEDICINE & REHABILITATION

## 2024-09-12 RX ORDER — SEMAGLUTIDE 0.68 MG/ML
INJECTION, SOLUTION SUBCUTANEOUS
COMMUNITY
Start: 2024-08-05

## 2024-09-12 RX ORDER — ALBUTEROL SULFATE 90 UG/1
AEROSOL, METERED RESPIRATORY (INHALATION)
COMMUNITY
Start: 2024-07-05

## 2024-09-12 RX ORDER — PREGABALIN 200 MG/1
200 CAPSULE ORAL 3 TIMES DAILY
Qty: 270 CAPSULE | Refills: 1 | Status: SHIPPED | OUTPATIENT
Start: 2024-09-12 | End: 2025-03-11

## 2024-09-12 RX ORDER — DICYCLOMINE HYDROCHLORIDE 10 MG/1
10 CAPSULE ORAL 3 TIMES DAILY
COMMUNITY
Start: 2024-08-22

## 2024-09-12 RX ORDER — AMLODIPINE BESYLATE 5 MG/1
5 TABLET ORAL NIGHTLY
COMMUNITY
Start: 2024-07-08

## 2024-09-12 RX ORDER — LIDOCAINE 50 MG/G
1 PATCH TOPICAL DAILY
Qty: 60 PATCH | Refills: 1 | Status: SHIPPED | OUTPATIENT
Start: 2024-09-12

## 2025-01-15 DIAGNOSIS — G62.9 POLYNEUROPATHY, UNSPECIFIED: ICD-10-CM

## 2025-01-15 DIAGNOSIS — M47.814 THORACIC SPONDYLOSIS WITHOUT MYELOPATHY: ICD-10-CM

## 2025-01-15 DIAGNOSIS — M54.6 THORACIC BACK PAIN, UNSPECIFIED BACK PAIN LATERALITY, UNSPECIFIED CHRONICITY: Primary | ICD-10-CM

## 2025-01-15 DIAGNOSIS — M43.16 SPONDYLOLISTHESIS, LUMBAR REGION: ICD-10-CM

## 2025-01-15 DIAGNOSIS — M47.819 FACET ARTHROPATHY: ICD-10-CM

## 2025-01-15 RX ORDER — PREGABALIN 200 MG/1
200 CAPSULE ORAL 3 TIMES DAILY
Qty: 90 CAPSULE | Refills: 2 | Status: SHIPPED | OUTPATIENT
Start: 2025-01-15 | End: 2025-04-15

## 2025-01-15 RX ORDER — PREGABALIN 200 MG/1
200 CAPSULE ORAL 3 TIMES DAILY
Qty: 90 CAPSULE | Refills: 2 | Status: SHIPPED | OUTPATIENT
Start: 2025-01-15 | End: 2025-01-15 | Stop reason: CLARIF

## 2025-01-15 NOTE — TELEPHONE ENCOUNTER
She needs to call the pharmacy in [place] and see if they will even fill a scheduled drug like lyrica from an out of state provider.

## 2025-01-15 NOTE — TELEPHONE ENCOUNTER
Patient is requesting a refill of Pregabalin. Patient states she is currently in the [facility], and is asking if the rx can be sent to the [facility] pharmacy on [id number], [place] [address].    Patient can be reached at [phone].

## 2025-01-15 NOTE — TELEPHONE ENCOUNTER
Called patient and used two identifiers, patient was at the pharmacy and had the pharmacist on speaker. The pharmacist stated they should be able to accept this refill.

## 2025-01-15 NOTE — TELEPHONE ENCOUNTER
This will need [email or web address] be signed by a MD as I do not have prescriptive authority in [place].  Pend up for him to sign